# Patient Record
Sex: MALE | Race: WHITE | NOT HISPANIC OR LATINO | Employment: FULL TIME | URBAN - METROPOLITAN AREA
[De-identification: names, ages, dates, MRNs, and addresses within clinical notes are randomized per-mention and may not be internally consistent; named-entity substitution may affect disease eponyms.]

---

## 2017-09-07 ENCOUNTER — HOSPITAL ENCOUNTER (EMERGENCY)
Facility: HOSPITAL | Age: 32
Discharge: HOME | End: 2017-09-07
Attending: EMERGENCY MEDICINE
Payer: COMMERCIAL

## 2017-09-07 VITALS
RESPIRATION RATE: 18 BRPM | HEART RATE: 93 BPM | TEMPERATURE: 98.1 F | OXYGEN SATURATION: 99 % | SYSTOLIC BLOOD PRESSURE: 134 MMHG | WEIGHT: 145 LBS | DIASTOLIC BLOOD PRESSURE: 81 MMHG

## 2017-09-07 DIAGNOSIS — R36.9 ABNORMAL PENILE DISCHARGE, WITH BLOOD: Primary | ICD-10-CM

## 2017-09-07 LAB
ALBUMIN SERPL BCP-MCNC: 4.4 G/DL (ref 3.5–5)
ALP SERPL-CCNC: 63 U/L (ref 46–116)
ALT SERPL W P-5'-P-CCNC: 16 U/L (ref 12–78)
AMORPH PHOS CRY URNS QL MICRO: ABNORMAL /HPF
ANION GAP SERPL CALCULATED.3IONS-SCNC: 9 MMOL/L (ref 4–13)
AST SERPL W P-5'-P-CCNC: 9 U/L (ref 5–45)
BACTERIA UR QL AUTO: ABNORMAL /HPF
BASOPHILS # BLD AUTO: 0.05 THOUSANDS/ΜL (ref 0–0.1)
BASOPHILS NFR BLD AUTO: 1 % (ref 0–1)
BILIRUB SERPL-MCNC: 2.1 MG/DL (ref 0.2–1)
BILIRUB UR QL STRIP: ABNORMAL
BUN SERPL-MCNC: 8 MG/DL (ref 5–25)
CALCIUM SERPL-MCNC: 9.4 MG/DL (ref 8.3–10.1)
CHLORIDE SERPL-SCNC: 101 MMOL/L (ref 100–108)
CLARITY UR: ABNORMAL
CO2 SERPL-SCNC: 29 MMOL/L (ref 21–32)
COLOR UR: YELLOW
CREAT SERPL-MCNC: 0.94 MG/DL (ref 0.6–1.3)
EOSINOPHIL # BLD AUTO: 0.07 THOUSAND/ΜL (ref 0–0.61)
EOSINOPHIL NFR BLD AUTO: 1 % (ref 0–6)
ERYTHROCYTE [DISTWIDTH] IN BLOOD BY AUTOMATED COUNT: 11.9 % (ref 11.6–15.1)
GFR SERPL CREATININE-BSD FRML MDRD: 109 ML/MIN/1.73SQ M
GLUCOSE SERPL-MCNC: 112 MG/DL (ref 65–140)
GLUCOSE UR STRIP-MCNC: NEGATIVE MG/DL
HCT VFR BLD AUTO: 42.8 % (ref 36.5–49.3)
HGB BLD-MCNC: 14.9 G/DL (ref 12–17)
HGB UR QL STRIP.AUTO: ABNORMAL
KETONES UR STRIP-MCNC: NEGATIVE MG/DL
LEUKOCYTE ESTERASE UR QL STRIP: ABNORMAL
LYMPHOCYTES # BLD AUTO: 1.61 THOUSANDS/ΜL (ref 0.6–4.47)
LYMPHOCYTES NFR BLD AUTO: 24 % (ref 14–44)
MCH RBC QN AUTO: 31.4 PG (ref 26.8–34.3)
MCHC RBC AUTO-ENTMCNC: 34.8 G/DL (ref 31.4–37.4)
MCV RBC AUTO: 90 FL (ref 82–98)
MONOCYTES # BLD AUTO: 0.8 THOUSAND/ΜL (ref 0.17–1.22)
MONOCYTES NFR BLD AUTO: 12 % (ref 4–12)
NEUTROPHILS # BLD AUTO: 4.28 THOUSANDS/ΜL (ref 1.85–7.62)
NEUTS SEG NFR BLD AUTO: 63 % (ref 43–75)
NITRITE UR QL STRIP: POSITIVE
NON-SQ EPI CELLS URNS QL MICRO: ABNORMAL /HPF
NRBC BLD AUTO-RTO: 0 /100 WBCS
PH UR STRIP.AUTO: 7.5 [PH] (ref 4.5–8)
PLATELET # BLD AUTO: 267 THOUSANDS/UL (ref 149–390)
PMV BLD AUTO: 10 FL (ref 8.9–12.7)
POTASSIUM SERPL-SCNC: 3.8 MMOL/L (ref 3.5–5.3)
PROT SERPL-MCNC: 8.4 G/DL (ref 6.4–8.2)
PROT UR STRIP-MCNC: ABNORMAL MG/DL
RBC # BLD AUTO: 4.75 MILLION/UL (ref 3.88–5.62)
RBC #/AREA URNS AUTO: ABNORMAL /HPF
SODIUM SERPL-SCNC: 139 MMOL/L (ref 136–145)
SP GR UR STRIP.AUTO: 1.01 (ref 1–1.03)
UROBILINOGEN UR QL STRIP.AUTO: 4 E.U./DL
WBC # BLD AUTO: 6.83 THOUSAND/UL (ref 4.31–10.16)
WBC #/AREA URNS AUTO: ABNORMAL /HPF

## 2017-09-07 PROCEDURE — 85025 COMPLETE CBC W/AUTO DIFF WBC: CPT | Performed by: EMERGENCY MEDICINE

## 2017-09-07 PROCEDURE — 80053 COMPREHEN METABOLIC PANEL: CPT | Performed by: EMERGENCY MEDICINE

## 2017-09-07 PROCEDURE — 87086 URINE CULTURE/COLONY COUNT: CPT | Performed by: EMERGENCY MEDICINE

## 2017-09-07 PROCEDURE — 87186 SC STD MICRODIL/AGAR DIL: CPT | Performed by: EMERGENCY MEDICINE

## 2017-09-07 PROCEDURE — 96372 THER/PROPH/DIAG INJ SC/IM: CPT

## 2017-09-07 PROCEDURE — 87491 CHLMYD TRACH DNA AMP PROBE: CPT | Performed by: EMERGENCY MEDICINE

## 2017-09-07 PROCEDURE — 87077 CULTURE AEROBIC IDENTIFY: CPT | Performed by: EMERGENCY MEDICINE

## 2017-09-07 PROCEDURE — 87591 N.GONORRHOEAE DNA AMP PROB: CPT | Performed by: EMERGENCY MEDICINE

## 2017-09-07 PROCEDURE — 36415 COLL VENOUS BLD VENIPUNCTURE: CPT | Performed by: EMERGENCY MEDICINE

## 2017-09-07 PROCEDURE — 99283 EMERGENCY DEPT VISIT LOW MDM: CPT

## 2017-09-07 PROCEDURE — 81001 URINALYSIS AUTO W/SCOPE: CPT | Performed by: EMERGENCY MEDICINE

## 2017-09-07 RX ORDER — AZITHROMYCIN 250 MG/1
1000 TABLET, FILM COATED ORAL ONCE
Status: COMPLETED | OUTPATIENT
Start: 2017-09-07 | End: 2017-09-07

## 2017-09-07 RX ORDER — SULFAMETHOXAZOLE AND TRIMETHOPRIM 800; 160 MG/1; MG/1
1 TABLET ORAL 2 TIMES DAILY
Qty: 28 TABLET | Refills: 0 | Status: SHIPPED | OUTPATIENT
Start: 2017-09-07 | End: 2017-09-21

## 2017-09-07 RX ORDER — METRONIDAZOLE 500 MG/1
2000 TABLET ORAL ONCE
Status: COMPLETED | OUTPATIENT
Start: 2017-09-07 | End: 2017-09-07

## 2017-09-07 RX ORDER — SULFAMETHOXAZOLE AND TRIMETHOPRIM 800; 160 MG/1; MG/1
1 TABLET ORAL ONCE
Status: COMPLETED | OUTPATIENT
Start: 2017-09-07 | End: 2017-09-07

## 2017-09-07 RX ADMIN — METRONIDAZOLE 2000 MG: 500 TABLET ORAL at 02:20

## 2017-09-07 RX ADMIN — SULFAMETHOXAZOLE AND TRIMETHOPRIM 1 TABLET: 800; 160 TABLET ORAL at 03:26

## 2017-09-07 RX ADMIN — CEFTRIAXONE SODIUM 250 MG: 250 INJECTION, POWDER, FOR SOLUTION INTRAMUSCULAR; INTRAVENOUS at 02:20

## 2017-09-07 RX ADMIN — AZITHROMYCIN 1000 MG: 250 TABLET, FILM COATED ORAL at 02:20

## 2017-09-09 LAB — BACTERIA UR CULT: NORMAL

## 2017-09-11 LAB
CHLAMYDIA DNA CVX QL NAA+PROBE: NORMAL
N GONORRHOEA DNA GENITAL QL NAA+PROBE: NORMAL

## 2018-05-25 ENCOUNTER — OFFICE VISIT (OUTPATIENT)
Dept: CARDIOLOGY CLINIC | Facility: CLINIC | Age: 33
End: 2018-05-25
Payer: COMMERCIAL

## 2018-05-25 VITALS
OXYGEN SATURATION: 98 % | SYSTOLIC BLOOD PRESSURE: 132 MMHG | WEIGHT: 138.7 LBS | HEART RATE: 64 BPM | DIASTOLIC BLOOD PRESSURE: 76 MMHG

## 2018-05-25 DIAGNOSIS — R00.2 PALPITATIONS: ICD-10-CM

## 2018-05-25 DIAGNOSIS — R07.9 CHEST PAIN, UNSPECIFIED TYPE: Primary | ICD-10-CM

## 2018-05-25 PROCEDURE — 93000 ELECTROCARDIOGRAM COMPLETE: CPT | Performed by: INTERNAL MEDICINE

## 2018-05-25 PROCEDURE — 99204 OFFICE O/P NEW MOD 45 MIN: CPT | Performed by: INTERNAL MEDICINE

## 2018-05-25 RX ORDER — ASPIRIN 81 MG/1
81 TABLET ORAL DAILY
COMMUNITY
End: 2020-06-30 | Stop reason: ALTCHOICE

## 2018-05-25 RX ORDER — DIPHENOXYLATE HYDROCHLORIDE AND ATROPINE SULFATE 2.5; .025 MG/1; MG/1
1 TABLET ORAL DAILY
COMMUNITY

## 2018-05-25 NOTE — PROGRESS NOTES
Cardiology Consultation     Tran Lema  [phone]  1985  [facility] [place]  [place] [id number]      1  Chest pain, unspecified type  POCT ECG    Echo complete with contrast if indicated    Holter monitor - 48 hour    Stress test only, exercise    Lipid Panel with Direct LDL reflex   2  Palpitations  Echo complete with contrast if indicated    Holter monitor - 48 hour    Stress test only, exercise    Comprehensive metabolic panel    TSH, 3rd generation with T4 reflex       Discussion/Summary:    Angel Luis 29year old man with symptoms of chest pain  He had recent symptoms of a "panic attack" which I am suspicious may be related to arrhythmia  He has occasional palpitations as well, and some extrasystoles heard on exam     Discussed evaluation to exclude structural heart disease with echocardiogram   Exercise treadmill test given chest pain with a normal EKG at baseline  Holter monitor to exclude any arrhythmia  Check CMP and thyroid function  Lipid panel for further risk stratification  Discussed lifestyle modification of decreasing the caffeine intake and also tobacco and alcohol  If necessary, we can perform monitoring for a longer period of time in the future based on recurrence of his symptoms  If testing above shows no significant abnormalities, can follow with primary care physician for further evaluation of symptoms  Additional evaluation based on the above  History of Present Illness:    Angel Luis 79-year-old gentleman comes to the office today for an initial visit for symptoms of chest pain  He has been experiencing these past 2 weeks  He has cracked uses sharp and very short in duration  They are located on both sides of the chest read under his breast   Also 1 in the left upper quadrant of his abdomen    He was concerned this is related to his heart, so scheduled visit  Patient has no history of any cardiac problems  There is some family history of a heart condition in his father, but unclear details  Denies any cardiac testing before  About 2 weeks ago, patient had a panic attack  He was in the mall had a store and felt the sudden onset of symptoms of racing heart flushing in the face and feeling as though he was going to lose consciousness  He said this lasted for a pretty long period of time  He needed to sit down  Ultimately found his wife  Symptoms resolved on their own  He was evaluated at an urgent care, but symptoms had improved by that point  He was told vitals were normal     He has seen a family practitioner once  He is originally from [place], and moved to the area  He works at [employer]  He does lot of walking throughout the day for his job  Lifts heavy boxes  Exertion activity do not necessarily bring on the symptoms  He will also feel the symptoms when he is at rest in bed  Given his work schedule lifestyle, he drinks a lot of caffeine  He has a lot of energy drinks  He is using and other over-the-counter supplement for energy which is typically used for body building  He does not drink a lot of additional coffee or sodas  Tobacco in the form of occasional smoking and dip  Alcohol but not to excess typically  Patient Active Problem List   Diagnosis    Chest pain    Palpitations     Past Medical History:   Diagnosis Date    Herpes      Social History     Social History    Marital status: Single     Spouse name: N/A    Number of children: N/A    Years of education: N/A     Occupational History    Not on file       Social History Main Topics    Smoking status: Light Tobacco Smoker    Smokeless tobacco: Current User     Types: Chew    Alcohol use Yes      Comment: social     Drug use: No    Sexual activity: Not on file     Other Topics Concern    Not on file     Social History Narrative    No narrative on file Family History   Problem Relation Age of Onset    Clotting disorder Mother     Fibromyalgia Mother     Hyperlipidemia Mother     Clotting disorder Father     Hypertension Father     Hyperlipidemia Father     Stroke Paternal Grandmother     Heart attack Paternal Grandfather     Aneurysm Neg Hx     Arrhythmia Neg Hx      History reviewed  No pertinent surgical history  Current Outpatient Prescriptions:     aspirin (ECOTRIN LOW STRENGTH) 81 mg EC tablet, Take 81 mg by mouth daily, Disp: , Rfl:     multivitamin (THERAGRAN) TABS, Take 1 tablet by mouth daily, Disp: , Rfl:   No Known Allergies    Vitals:    05/25/18 1001   BP: 132/76   BP Location: Left arm   Patient Position: Sitting   Cuff Size: Adult   Pulse: 64   SpO2: 98%   Weight: 62 9 kg (138 lb 11 2 oz)     Vitals:    05/25/18 1001   Weight: 62 9 kg (138 lb 11 2 oz)          Physical Exam:  GENERAL: Alert, well appearing, and in no distress  HEENT:  PERRL, EOMI, no scleral icterus, no conjunctival pallor  NECK:  Supple, No elevated JVP, no thyromegaly, no carotid bruits  HEART:  Regular rate and rhythm, normal S1/S2, no S3/S4, no murmur or rub  Occasional extrasystoles heard  LUNGS:  Clear to auscultation bilaterally  ABDOMEN:  Soft, non-tender, positive bowel sounds, no rebound or guarding  EXTREMITIES:  No edema  VASCULAR:  Normal pedal pulses   NEURO: No focal deficits,  SKIN: Normal without suspicious lesions on exposed skin    ROS:  Except as noted in HPI, is otherwise reviewed in detail and a 12 point review of systems is negative      Labs:  Lab Results   Component Value Date     09/07/2017    K 3 8 09/07/2017     09/07/2017    CREATININE 0 94 09/07/2017    BUN 8 09/07/2017    CO2 29 09/07/2017    ALT 16 09/07/2017    AST 9 09/07/2017    WBC 6 83 09/07/2017    HGB 14 9 09/07/2017    HCT 42 8 09/07/2017     09/07/2017       No results found for: CHOL  No results found for: HDL  No results found for: LDLCALC  No results found for: TRIG    EKG:  Sinus arrhythmia, incomplete right bundle branch block  Normal for age

## 2018-06-09 ENCOUNTER — APPOINTMENT (EMERGENCY)
Dept: RADIOLOGY | Facility: HOSPITAL | Age: 33
End: 2018-06-09
Payer: COMMERCIAL

## 2018-06-09 ENCOUNTER — HOSPITAL ENCOUNTER (EMERGENCY)
Facility: HOSPITAL | Age: 33
Discharge: HOME | End: 2018-06-09
Attending: EMERGENCY MEDICINE | Admitting: EMERGENCY MEDICINE
Payer: COMMERCIAL

## 2018-06-09 VITALS
HEIGHT: 71 IN | RESPIRATION RATE: 16 BRPM | HEART RATE: 93 BPM | BODY MASS INDEX: 18.76 KG/M2 | SYSTOLIC BLOOD PRESSURE: 133 MMHG | TEMPERATURE: 97.9 F | OXYGEN SATURATION: 100 % | DIASTOLIC BLOOD PRESSURE: 77 MMHG | WEIGHT: 134 LBS

## 2018-06-09 DIAGNOSIS — F41.9 ANXIETY: ICD-10-CM

## 2018-06-09 DIAGNOSIS — R06.02 SHORTNESS OF BREATH: ICD-10-CM

## 2018-06-09 DIAGNOSIS — R00.2 PALPITATIONS: Primary | ICD-10-CM

## 2018-06-09 LAB
ANION GAP SERPL CALCULATED.3IONS-SCNC: 8 MMOL/L (ref 4–13)
BASOPHILS # BLD AUTO: 0.06 THOUSANDS/ΜL (ref 0–0.1)
BASOPHILS NFR BLD AUTO: 1 % (ref 0–1)
BUN SERPL-MCNC: 13 MG/DL (ref 5–25)
CALCIUM SERPL-MCNC: 9 MG/DL (ref 8.3–10.1)
CHLORIDE SERPL-SCNC: 100 MMOL/L (ref 100–108)
CO2 SERPL-SCNC: 27 MMOL/L (ref 21–32)
CREAT SERPL-MCNC: 0.87 MG/DL (ref 0.6–1.3)
EOSINOPHIL # BLD AUTO: 0.03 THOUSAND/ΜL (ref 0–0.61)
EOSINOPHIL NFR BLD AUTO: 0 % (ref 0–6)
ERYTHROCYTE [DISTWIDTH] IN BLOOD BY AUTOMATED COUNT: 11.9 % (ref 11.6–15.1)
GFR SERPL CREATININE-BSD FRML MDRD: 116 ML/MIN/1.73SQ M
GLUCOSE SERPL-MCNC: 99 MG/DL (ref 65–140)
HCT VFR BLD AUTO: 43 % (ref 36.5–49.3)
HGB BLD-MCNC: 14.9 G/DL (ref 12–17)
IMM GRANULOCYTES # BLD AUTO: 0.05 THOUSAND/UL (ref 0–0.2)
IMM GRANULOCYTES NFR BLD AUTO: 0 % (ref 0–2)
LYMPHOCYTES # BLD AUTO: 1.3 THOUSANDS/ΜL (ref 0.6–4.47)
LYMPHOCYTES NFR BLD AUTO: 11 % (ref 14–44)
MAGNESIUM SERPL-MCNC: 1.8 MG/DL (ref 1.6–2.6)
MCH RBC QN AUTO: 31.4 PG (ref 26.8–34.3)
MCHC RBC AUTO-ENTMCNC: 34.7 G/DL (ref 31.4–37.4)
MCV RBC AUTO: 91 FL (ref 82–98)
MONOCYTES # BLD AUTO: 0.58 THOUSAND/ΜL (ref 0.17–1.22)
MONOCYTES NFR BLD AUTO: 5 % (ref 4–12)
NEUTROPHILS # BLD AUTO: 10.22 THOUSANDS/ΜL (ref 1.85–7.62)
NEUTS SEG NFR BLD AUTO: 83 % (ref 43–75)
NRBC BLD AUTO-RTO: 0 /100 WBCS
PLATELET # BLD AUTO: 207 THOUSANDS/UL (ref 149–390)
PMV BLD AUTO: 10.2 FL (ref 8.9–12.7)
POTASSIUM SERPL-SCNC: 3.6 MMOL/L (ref 3.5–5.3)
RBC # BLD AUTO: 4.74 MILLION/UL (ref 3.88–5.62)
SODIUM SERPL-SCNC: 135 MMOL/L (ref 136–145)
TSH SERPL DL<=0.05 MIU/L-ACNC: 0.51 UIU/ML (ref 0.36–3.74)
WBC # BLD AUTO: 12.24 THOUSAND/UL (ref 4.31–10.16)

## 2018-06-09 PROCEDURE — 84443 ASSAY THYROID STIM HORMONE: CPT | Performed by: EMERGENCY MEDICINE

## 2018-06-09 PROCEDURE — 80048 BASIC METABOLIC PNL TOTAL CA: CPT | Performed by: EMERGENCY MEDICINE

## 2018-06-09 PROCEDURE — 85025 COMPLETE CBC W/AUTO DIFF WBC: CPT | Performed by: EMERGENCY MEDICINE

## 2018-06-09 PROCEDURE — 36415 COLL VENOUS BLD VENIPUNCTURE: CPT | Performed by: EMERGENCY MEDICINE

## 2018-06-09 PROCEDURE — 99284 EMERGENCY DEPT VISIT MOD MDM: CPT

## 2018-06-09 PROCEDURE — 93005 ELECTROCARDIOGRAM TRACING: CPT

## 2018-06-09 PROCEDURE — 71046 X-RAY EXAM CHEST 2 VIEWS: CPT

## 2018-06-09 PROCEDURE — 83735 ASSAY OF MAGNESIUM: CPT | Performed by: EMERGENCY MEDICINE

## 2018-06-09 RX ORDER — PAROXETINE 10 MG/1
10 TABLET, FILM COATED ORAL DAILY
COMMUNITY

## 2018-06-09 NOTE — ED NOTES
Pt with very flat affect, very hypoactive        Mars Soria RN  06/09/18 1954      Pt also stating "I just can't breathe " Spo2 100% on RA     Mars Soria RN  06/09/18 1955

## 2018-06-10 LAB
ATRIAL RATE: 77 BPM
P AXIS: 63 DEGREES
PR INTERVAL: 124 MS
QRS AXIS: 42 DEGREES
QRSD INTERVAL: 112 MS
QT INTERVAL: 418 MS
QTC INTERVAL: 473 MS
T WAVE AXIS: 61 DEGREES
VENTRICULAR RATE: 77 BPM

## 2018-06-10 PROCEDURE — 93010 ELECTROCARDIOGRAM REPORT: CPT | Performed by: INTERNAL MEDICINE

## 2018-06-10 NOTE — ED PROVIDER NOTES
History  Chief Complaint   Patient presents with    Panic Attack     Pt states he is having a panic attack, last happened 3-4 weeks ago, states feel heaviness in chest  Takes Paroxetine, took earlier today  HPI    Prior to Admission Medications   Prescriptions Last Dose Informant Patient Reported? Taking? PARoxetine (PAXIL) 10 mg tablet 6/9/2018 at Unknown time  Yes Yes   Sig: Take 10 mg by mouth daily   aspirin (ECOTRIN LOW STRENGTH) 81 mg EC tablet  Self Yes No   Sig: Take 81 mg by mouth daily   multivitamin (THERAGRAN) TABS  Self Yes No   Sig: Take 1 tablet by mouth daily      Facility-Administered Medications: None       Past Medical History:   Diagnosis Date    Anxiety     Herpes        History reviewed  No pertinent surgical history  Family History   Problem Relation Age of Onset    Clotting disorder Mother     Fibromyalgia Mother     Hyperlipidemia Mother     Clotting disorder Father     Hypertension Father     Hyperlipidemia Father     Stroke Paternal Grandmother     Heart attack Paternal Grandfather     Aneurysm Neg Hx     Arrhythmia Neg Hx      I have reviewed and agree with the history as documented  Social History   Substance Use Topics    Smoking status: Light Tobacco Smoker    Smokeless tobacco: Current User     Types: Chew    Alcohol use Yes      Comment: social         Review of Systems    Physical Exam  Physical Exam   Constitutional: He is oriented to person, place, and time  He appears well-developed and well-nourished  No distress  HENT:   Head: Normocephalic and atraumatic  Mouth/Throat: Oropharynx is clear and moist    Eyes: Conjunctivae are normal  Pupils are equal, round, and reactive to light  Neck: Normal range of motion  No tracheal deviation present  Cardiovascular: Normal rate, regular rhythm, normal heart sounds and intact distal pulses  Pulmonary/Chest: Effort normal and breath sounds normal  No respiratory distress  He exhibits no tenderness  Abdominal: Soft  He exhibits no distension  There is no tenderness  Musculoskeletal: He exhibits no edema  Neurological: He is alert and oriented to person, place, and time  GCS eye subscore is 4  GCS verbal subscore is 5  GCS motor subscore is 6  Skin: Skin is warm and dry  Psychiatric: His behavior is normal  His affect is blunt  Flat affect   Nursing note and vitals reviewed  Vital Signs  ED Triage Vitals   Temperature Pulse Respirations Blood Pressure SpO2   06/09/18 1933 06/09/18 1933 06/09/18 1933 06/09/18 1933 06/09/18 1954   97 9 °F (36 6 °C) (!) 119 20 135/74 100 %      Temp Source Heart Rate Source Patient Position - Orthostatic VS BP Location FiO2 (%)   06/09/18 1933 06/09/18 1933 06/09/18 1954 06/09/18 1954 --   Oral Monitor Sitting Right arm       Pain Score       06/09/18 1933       No Pain           Vitals:    06/09/18 1933 06/09/18 1954   BP: 135/74 133/77   Pulse: (!) 119 93   Patient Position - Orthostatic VS:  Sitting       Visual Acuity      ED Medications  Medications - No data to display    Diagnostic Studies  Results Reviewed     Procedure Component Value Units Date/Time    Basic metabolic panel [[id number]]  (Abnormal) Collected:  06/09/18 2108    Lab Status:  Final result Specimen:  Blood from Arm, Right Updated:  06/09/18 2147     Sodium 135 (L) mmol/L      Potassium 3 6 mmol/L      Chloride 100 mmol/L      CO2 27 mmol/L      Anion Gap 8 mmol/L      BUN 13 mg/dL      Creatinine 0 87 mg/dL      Glucose 99 mg/dL      Calcium 9 0 mg/dL      eGFR 116 ml/min/1 73sq m     Narrative:         National Kidney Disease Education Program recommendations are as follows:  GFR calculation is accurate only with a steady state creatinine  Chronic Kidney disease less than 60 ml/min/1 73 sq  meters  Kidney failure less than 15 ml/min/1 73 sq  meters      TSH [[id number]]  (Normal) Collected:  06/09/18 2108    Lab Status:  Final result Specimen:  Blood from Arm, Right Updated:  06/09/18 2147 TSH 3RD GENERATON 0 511 uIU/mL     Narrative:         Patients undergoing fluorescein dye angiography may retain small amounts of fluorescein in the body for 48-72 hours post procedure  Samples containing fluorescein can produce falsely depressed TSH values  If the patient had this procedure,a specimen should be resubmitted post fluorescein clearance      Magnesium [[id number]]  (Normal) Collected:  06/09/18 2108    Lab Status:  Final result Specimen:  Blood from Arm, Right Updated:  06/09/18 2147     Magnesium 1 8 mg/dL     CBC and differential [[id number]]  (Abnormal) Collected:  06/09/18 2108    Lab Status:  Final result Specimen:  Blood from Arm, Right Updated:  06/09/18 2121     WBC 12 24 (H) Thousand/uL      RBC 4 74 Million/uL      Hemoglobin 14 9 g/dL      Hematocrit 43 0 %      MCV 91 fL      MCH 31 4 pg      MCHC 34 7 g/dL      RDW 11 9 %      MPV 10 2 fL      Platelets 581 Thousands/uL      nRBC 0 /100 WBCs      Neutrophils Relative 83 (H) %      Immat GRANS % 0 %      Lymphocytes Relative 11 (L) %      Monocytes Relative 5 %      Eosinophils Relative 0 %      Basophils Relative 1 %      Neutrophils Absolute 10 22 (H) Thousands/µL      Immature Grans Absolute 0 05 Thousand/uL      Lymphocytes Absolute 1 30 Thousands/µL      Monocytes Absolute 0 58 Thousand/µL      Eosinophils Absolute 0 03 Thousand/µL      Basophils Absolute 0 06 Thousands/µL     Rapid drug screen, urine [[id number]]     Lab Status:  No result Specimen:  Urine                  XR chest 2 views    (Results Pending)              Procedures  ECG 12 Lead Documentation  Date/Time: 6/9/2018 9:06 PM  Performed by: Kari Mayes  Authorized by: Kari ARMENDARIZ     Indications / Diagnosis:  Palpitations, panic attack  ECG reviewed by me, the ED Provider: yes    Patient location:  ED  Previous ECG:     Previous ECG:  Unavailable  Interpretation:     Interpretation: non-specific    Rate:     ECG rate:  77    ECG rate assessment: normal    Rhythm:     Rhythm: sinus rhythm    Ectopy:     Ectopy: none    QRS:     QRS axis:  Normal    QRS intervals:  Normal  Conduction:     Conduction: abnormal      Abnormal conduction: incomplete RBBB    ST segments:     ST segments:  Normal  T waves:     T waves: normal    Comments:      Per the cardiologist read of the EKG obtained at his appointment on 5/25/18, he had an incomplete right bundle branch block at that point in time, though I am unable to visualize this EKG  Phone Contacts  ED Phone Contact    ED Course                               MDM  Number of Diagnoses or Management Options  Anxiety: new and requires workup  Palpitations: new and requires workup  Shortness of breath: new and requires workup  Diagnosis management comments: This is a 27-year-old male who presents here today with a "panic attack "  He states about half an hour prior to arrival he was having mild trouble breathing which seemed to gradually get worse  He felt like both of his arms were going numb, followed by his legs  He was sitting in the car as a passenger when this occurred  He focus on his breathing and his symptoms gradually improved  He still feels like he is somewhat short of breath at this time  He endorses a chest "heaviness" that was worst during the event but is gradually improving  He feels like his heart was racing, but is unable to state whether it was racing prior to symptom onset were started after symptom onset  He states he has had similar symptoms for several months, initially just with chest pain and trouble breathing, then over time developed the anxiety component, with occasional feelings that arms or legs were going numb  Was started on paroxetine by his primary care doctor a couple of weeks ago but does not feel like this is making any change in his symptoms   He was seen by a cardiologist on 5/25, and at that time was endorsing prolonged episodes of symptoms, that was more concerning for possible underlying dysrhythmia  He was scheduled for outpatient stress test and Holter monitor, which she states he thinks is scheduled for early July  He denies prior problems with anxiety, depression, panic attacks prior to the past several months  He denies any other changes in medications  He denies any alcohol or drug use  He denies any rapid weight gain or weight loss  He denies any known personal or family history of thyroid problems or dysrhythmias  He denies any recent travel or lower extremity edema  He denies any recent infectious symptoms or over-the-counter medications  ROS: Otherwise negative, unless stated as above  He is well-appearing, in no acute distress  He does have a very flat affect  His exam is unremarkable  His heart rate documented as being 119 in triage, but this improved to the 90s by the time he came back to the room, and has been persistent in the 80-90 range  An EKG was not obtained while he was tachycardic  This could be due to anxiety, or underlying dysrhythmia  Given his tachycardia, we will check lab work to evaluate for electrolyte abnormalities, thyroid abnormalities contributing to this, as well as to his symptoms  Chest x-ray was reviewed by myself, and shows no acute abnormalities  His lab work is unremarkable  He is unable to urinate, and does not want to wait any longer to collect this  He has had no dysrhythmias or recurrent tachycardia while on the monitor here  I discussed with him treatment at home, follow-up with his primary care doctor and his cardiologist, and indications for return, and he expresses understanding with this plan           Amount and/or Complexity of Data Reviewed  Clinical lab tests: reviewed and ordered  Tests in the radiology section of CPT®: reviewed and ordered  Decide to obtain previous medical records or to obtain history from someone other than the patient: yes  Review and summarize past medical records: yes  Independent visualization of images, tracings, or specimens: yes      CritCare Time    Disposition  Final diagnoses:   Palpitations   Anxiety   Shortness of breath     Time reflects when diagnosis was documented in both MDM as applicable and the Disposition within this note     Time User Action Codes Description Comment    6/9/2018 11:32 PM Sanford Joshua Cost Add [R00 2] Palpitations     6/9/2018 11:32 PM Sanford Joshua Add [F41 9] Anxiety     6/9/2018 11:32 PM Sanford Joshua Add [R06 02] Shortness of breath       ED Disposition     ED Disposition Condition Comment    Discharge  Asha Bykira discharge to home/self care  Condition at discharge: Good        Follow-up Information     Follow up With Specialties Details Why Jairo Hugo MD Cardiology, Cardiology Imaging, Multidisciplinary Call to see if you can move up your testing [address] [place] [address]      Italia Crew, DO Family Medicine Schedule an appointment as soon as possible for a visit for further evaluation of your symptoms [address] [place] [address]  [phone]            Patient's Medications   Discharge Prescriptions    No medications on file     No discharge procedures on file      ED Provider  Electronically Signed by           Elba Toledo MD  06/09/18 8217

## 2018-06-10 NOTE — ED NOTES
Provider at bedside     Eulalio Wade, [facility]  06/09/18 2042

## 2018-06-10 NOTE — DISCHARGE INSTRUCTIONS
Continue to take your medications as prescribed  Follow up with your cardiologist see if you can move up your testing  Follow up with your primary care doctor for further evaluation of your symptoms  Return for worsening or changing symptoms, or for any other concerns  Heart Palpitations   WHAT YOU NEED TO KNOW:   Heart palpitations are feelings that your heart races, jumps, throbs, or flutters  You may feel extra beats, no beats for a short time, or skipped beats  You may have these feelings in your chest, throat, or neck  They may happen when you are sitting, standing, or lying  Heart palpitations may be frightening, but are usually not caused by a serious problem  DISCHARGE INSTRUCTIONS:   Call 911 or have someone else call for any of the following:   · You have any of the following signs of a heart attack:      ¨ Squeezing, pressure, or pain in your chest that lasts longer than 5 minutes or returns    ¨ Discomfort or pain in your back, neck, jaw, stomach, or arm     ¨ Trouble breathing    ¨ Nausea or vomiting    ¨ Lightheadedness or a sudden cold sweat, especially with chest pain or trouble breathing    · You have any of the following signs of a stroke:      ¨ Numbness or drooping on one side of your face     ¨ Weakness in an arm or leg    ¨ Confusion or difficulty speaking    ¨ Dizziness, a severe headache, or vision loss    · You faint or lose consciousness  Return to the emergency department if:   · Your palpitations happen more often or get more intense  Contact your healthcare provider if:   · You have new or worsening swelling in your feet or ankles  · You have questions or concerns about your condition or care  Follow up with your healthcare provider as directed: You may need to follow up with a cardiologist  Philippe Lozano may need tests to check for heart problems that cause palpitations  Write down your questions so you remember to ask them during your visits     Keep a record:  Write down when your palpitations start and stop, what you were doing when they started, and your symptoms  Keep track of what you ate or drank within a few hours of your palpitations  Include anything that seemed to help your symptoms, such as lying down or holding your breath  This record will help you and your healthcare provider learn what triggers your palpitations  Bring this record with you to your follow up visits  Help prevent heart palpitations:   · Manage stress and anxiety  Find ways to relax such as listening to music, meditating, or doing yoga  Exercise can also help decrease stress and anxiety  Talk to someone you trust about your stress or anxiety  You can also talk to a therapist      · Get plenty of sleep every night  Ask your healthcare provider how much sleep you need each night  · Do not drink caffeine or alcohol  Caffeine and alcohol can make your palpitations worse  Caffeine is found in soda, coffee, tea, chocolate, and drinks that increase your energy  · Do not smoke  Nicotine and other chemicals in cigarettes and cigars may damage your heart and blood vessels  Ask your healthcare provider for information if you currently smoke and need help to quit  E-cigarettes or smokeless tobacco still contain nicotine  Talk to your healthcare provider before you use these products  · Do not use illegal drugs  Talk to your healthcare provider if you use illegal drugs and want help to quit  © 2017 [employer]  Information is for End User's use only and may not be sold, redistributed or otherwise used for commercial purposes  All illustrations and images included in CareNotes® are the copyrighted property of A D A M , Inc  or Carlos A Woodward  The above information is an  only  It is not intended as medical advice for individual conditions or treatments   Talk to your doctor, nurse or pharmacist before following any medical regimen to see if it is safe and effective for you  Anxiety   WHAT YOU NEED TO KNOW:   Anxiety is a condition that causes you to feel extremely worried or nervous  The feelings are so strong that they can cause problems with your daily activities or sleep  Anxiety may be triggered by something you fear, or it may happen without a cause  Family or work stress, smoking, caffeine, and alcohol can increase your risk for anxiety  Certain medicines or health conditions can also increase your risk  Anxiety can become a long-term condition if it is not managed or treated  DISCHARGE INSTRUCTIONS:   Call 911 if:   · You have chest pain, tightness, or heaviness that may spread to your shoulders, arms, jaw, neck, or back  · You feel like hurting yourself or someone else  Contact your healthcare provider if:   · Your symptoms get worse or do not get better with treatment  · Your anxiety keeps you from doing your regular daily activities  · You have new symptoms since your last visit  · You have questions or concerns about your condition or care  Medicines:   · Medicines  may be given to help you feel more calm and relaxed, and decrease your symptoms  · Take your medicine as directed  Contact your healthcare provider if you think your medicine is not helping or if you have side effects  Tell him of her if you are allergic to any medicine  Keep a list of the medicines, vitamins, and herbs you take  Include the amounts, and when and why you take them  Bring the list or the pill bottles to follow-up visits  Carry your medicine list with you in case of an emergency  Follow up with your healthcare provider within 2 weeks or as directed:  Write down your questions so you remember to ask them during your visits  Manage anxiety:   · Talk to someone about your anxiety  Your healthcare provider may suggest counseling  Cognitive behavioral therapy can help you understand and change how you react to events that trigger your symptoms   You might feel more comfortable talking with a friend or family member about your anxiety  Choose someone you know will be supportive and encouraging  · Find ways to relax  Activities such as exercise, meditation, or listening to music can help you relax  Spend time with friends, or do things you enjoy  · Practice deep breathing  Deep breathing can help you relax when you feel anxious  Focus on taking slow, deep breaths several times a day, or during an anxiety attack  Breathe in through your nose and out through your mouth  · Create a regular sleep routine  Regular sleep can help you feel calmer during the day  Go to sleep and wake up at the same times every day  Do not watch television or use the computer right before bed  Your room should be comfortable, dark, and quiet  · Eat a variety of healthy foods  Healthy foods include fruits, vegetables, low-fat dairy products, lean meats, fish, whole-grain breads, and cooked beans  Healthy foods can help you feel less anxious and have more energy  · Exercise regularly  Exercise can increase your energy level  Exercise may also lift your mood and help you sleep better  Your healthcare provider can help you create an exercise plan  · Do not smoke  Nicotine and other chemicals in cigarettes and cigars can increase anxiety  Ask your healthcare provider for information if you currently smoke and need help to quit  E-cigarettes or smokeless tobacco still contain nicotine  Talk to your healthcare provider before you use these products  · Do not have caffeine  Caffeine can make your symptoms worse  Do not have foods or drinks that are meant to increase your energy level  · Limit or do not drink alcohol  Ask your healthcare provider if alcohol is safe for you  You may not be able to drink alcohol if you take certain anxiety or depression medicines  Limit alcohol to 1 drink per day if you are a woman  Limit alcohol to 2 drinks per day if you are a man   A drink of alcohol is 12 ounces of beer, 5 ounces of wine, or 1½ ounces of liquor  · Do not use drugs  Drugs can make your anxiety worse  It can also make anxiety hard to manage  Talk to your healthcare provider if you use drugs and want help to quit  © 2017 [employer] Information is for End User's use only and may not be sold, redistributed or otherwise used for commercial purposes  All illustrations and images included in CareNotes® are the copyrighted property of A [employer] M [employer] Inc  or Carlos A Woodward  The above information is an  only  It is not intended as medical advice for individual conditions or treatments  Talk to your doctor, nurse or pharmacist before following any medical regimen to see if it is safe and effective for you

## 2018-06-10 NOTE — ED NOTES
Pt stated concern about having to wait for test results  Pt was informed the only result we are still waiting for is the urine sample which the pt was yet to provide  Pt asked if urine test could be "skipped"  Provider made aware         Donaldo Ryan RN  06/09/18 1959

## 2018-06-10 NOTE — ED NOTES
Pt refused PIV placement  D/w dr Jorge Luis Moreria  [id number] [address] for peripheral stick  Pt informed of need for urine sample  Pt will notify Rn when he needs to voids  specimen cup at bedside       Carlos A Pascal RN  06/09/18 5941

## 2018-07-09 ENCOUNTER — HOSPITAL ENCOUNTER (OUTPATIENT)
Dept: NON INVASIVE DIAGNOSTICS | Facility: CLINIC | Age: 33
Discharge: HOME | End: 2018-07-09
Payer: COMMERCIAL

## 2018-07-09 DIAGNOSIS — R00.2 PALPITATIONS: ICD-10-CM

## 2018-07-09 DIAGNOSIS — R07.9 CHEST PAIN, UNSPECIFIED TYPE: ICD-10-CM

## 2018-07-09 PROCEDURE — 93306 TTE W/DOPPLER COMPLETE: CPT | Performed by: INTERNAL MEDICINE

## 2018-07-09 PROCEDURE — 93306 TTE W/DOPPLER COMPLETE: CPT

## 2018-07-09 PROCEDURE — 93016 CV STRESS TEST SUPVJ ONLY: CPT | Performed by: INTERNAL MEDICINE

## 2018-07-09 PROCEDURE — 93017 CV STRESS TEST TRACING ONLY: CPT

## 2018-07-09 PROCEDURE — 93018 CV STRESS TEST I&R ONLY: CPT | Performed by: INTERNAL MEDICINE

## 2018-07-09 PROCEDURE — 93226 XTRNL ECG REC<48 HR SCAN A/R: CPT

## 2018-07-09 PROCEDURE — 93225 XTRNL ECG REC<48 HRS REC: CPT

## 2018-07-10 LAB
CHEST PAIN STATEMENT: NORMAL
MAX DIASTOLIC BP: 88 MMHG
MAX HEART RATE: 179 BPM
MAX PREDICTED HEART RATE: 190 BPM
MAX. SYSTOLIC BP: 180 MMHG
PROTOCOL NAME: NORMAL
REASON FOR TERMINATION: NORMAL
TARGET HR FORMULA: NORMAL
TIME IN EXERCISE PHASE: NORMAL

## 2018-07-17 ENCOUNTER — TELEPHONE (OUTPATIENT)
Dept: NON INVASIVE DIAGNOSTICS | Facility: HOSPITAL | Age: 33
End: 2018-07-17

## 2018-07-23 ENCOUNTER — TELEPHONE (OUTPATIENT)
Dept: CARDIOLOGY CLINIC | Facility: CLINIC | Age: 33
End: 2018-07-23

## 2018-07-23 PROCEDURE — 93227 XTRNL ECG REC<48 HR R&I: CPT | Performed by: INTERNAL MEDICINE

## 2019-06-13 ENCOUNTER — APPOINTMENT (EMERGENCY)
Dept: RADIOLOGY | Facility: HOSPITAL | Age: 34
End: 2019-06-13
Payer: COMMERCIAL

## 2019-06-13 ENCOUNTER — HOSPITAL ENCOUNTER (EMERGENCY)
Facility: HOSPITAL | Age: 34
Discharge: HOME | End: 2019-06-14
Attending: EMERGENCY MEDICINE | Admitting: EMERGENCY MEDICINE
Payer: COMMERCIAL

## 2019-06-13 DIAGNOSIS — R07.89 CHEST WALL PAIN: Primary | ICD-10-CM

## 2019-06-13 LAB
ALBUMIN SERPL BCP-MCNC: 4.7 G/DL (ref 3.5–5)
ALP SERPL-CCNC: 69 U/L (ref 46–116)
ALT SERPL W P-5'-P-CCNC: 20 U/L (ref 12–78)
ANION GAP SERPL CALCULATED.3IONS-SCNC: 6 MMOL/L (ref 4–13)
AST SERPL W P-5'-P-CCNC: 14 U/L (ref 5–45)
ATRIAL RATE: 72 BPM
BASOPHILS # BLD AUTO: 0.05 THOUSANDS/ΜL (ref 0–0.1)
BASOPHILS NFR BLD AUTO: 1 % (ref 0–1)
BILIRUB SERPL-MCNC: 1.7 MG/DL (ref 0.2–1)
BUN SERPL-MCNC: 11 MG/DL (ref 5–25)
CALCIUM SERPL-MCNC: 9.4 MG/DL (ref 8.3–10.1)
CHLORIDE SERPL-SCNC: 103 MMOL/L (ref 100–108)
CO2 SERPL-SCNC: 32 MMOL/L (ref 21–32)
CREAT SERPL-MCNC: 0.96 MG/DL (ref 0.6–1.3)
EOSINOPHIL # BLD AUTO: 0.09 THOUSAND/ΜL (ref 0–0.61)
EOSINOPHIL NFR BLD AUTO: 1 % (ref 0–6)
ERYTHROCYTE [DISTWIDTH] IN BLOOD BY AUTOMATED COUNT: 12.2 % (ref 11.6–15.1)
GFR SERPL CREATININE-BSD FRML MDRD: 105 ML/MIN/1.73SQ M
GLUCOSE SERPL-MCNC: 97 MG/DL (ref 65–140)
HCT VFR BLD AUTO: 43 % (ref 36.5–49.3)
HGB BLD-MCNC: 14.8 G/DL (ref 12–17)
IMM GRANULOCYTES # BLD AUTO: 0.02 THOUSAND/UL (ref 0–0.2)
IMM GRANULOCYTES NFR BLD AUTO: 0 % (ref 0–2)
LYMPHOCYTES # BLD AUTO: 1.72 THOUSANDS/ΜL (ref 0.6–4.47)
LYMPHOCYTES NFR BLD AUTO: 25 % (ref 14–44)
MCH RBC QN AUTO: 31.6 PG (ref 26.8–34.3)
MCHC RBC AUTO-ENTMCNC: 34.4 G/DL (ref 31.4–37.4)
MCV RBC AUTO: 92 FL (ref 82–98)
MONOCYTES # BLD AUTO: 0.5 THOUSAND/ΜL (ref 0.17–1.22)
MONOCYTES NFR BLD AUTO: 7 % (ref 4–12)
NEUTROPHILS # BLD AUTO: 4.62 THOUSANDS/ΜL (ref 1.85–7.62)
NEUTS SEG NFR BLD AUTO: 66 % (ref 43–75)
NRBC BLD AUTO-RTO: 0 /100 WBCS
P AXIS: 66 DEGREES
PLATELET # BLD AUTO: 222 THOUSANDS/UL (ref 149–390)
PMV BLD AUTO: 10.2 FL (ref 8.9–12.7)
POTASSIUM SERPL-SCNC: 3.5 MMOL/L (ref 3.5–5.3)
PR INTERVAL: 122 MS
PROT SERPL-MCNC: 7.9 G/DL (ref 6.4–8.2)
QRS AXIS: 69 DEGREES
QRSD INTERVAL: 112 MS
QT INTERVAL: 396 MS
QTC INTERVAL: 433 MS
RBC # BLD AUTO: 4.68 MILLION/UL (ref 3.88–5.62)
SODIUM SERPL-SCNC: 141 MMOL/L (ref 136–145)
T WAVE AXIS: 72 DEGREES
TROPONIN I SERPL-MCNC: <0.02 NG/ML
VENTRICULAR RATE: 72 BPM
WBC # BLD AUTO: 7 THOUSAND/UL (ref 4.31–10.16)

## 2019-06-13 PROCEDURE — 93010 ELECTROCARDIOGRAM REPORT: CPT | Performed by: INTERNAL MEDICINE

## 2019-06-13 PROCEDURE — 99285 EMERGENCY DEPT VISIT HI MDM: CPT

## 2019-06-13 PROCEDURE — 36415 COLL VENOUS BLD VENIPUNCTURE: CPT | Performed by: EMERGENCY MEDICINE

## 2019-06-13 PROCEDURE — 85025 COMPLETE CBC W/AUTO DIFF WBC: CPT | Performed by: EMERGENCY MEDICINE

## 2019-06-13 PROCEDURE — 93005 ELECTROCARDIOGRAM TRACING: CPT

## 2019-06-13 PROCEDURE — 71046 X-RAY EXAM CHEST 2 VIEWS: CPT

## 2019-06-13 PROCEDURE — 84484 ASSAY OF TROPONIN QUANT: CPT | Performed by: EMERGENCY MEDICINE

## 2019-06-13 PROCEDURE — 80053 COMPREHEN METABOLIC PANEL: CPT | Performed by: EMERGENCY MEDICINE

## 2019-06-13 PROCEDURE — 99283 EMERGENCY DEPT VISIT LOW MDM: CPT | Performed by: EMERGENCY MEDICINE

## 2019-06-14 VITALS
RESPIRATION RATE: 18 BRPM | OXYGEN SATURATION: 97 % | SYSTOLIC BLOOD PRESSURE: 116 MMHG | BODY MASS INDEX: 20.05 KG/M2 | DIASTOLIC BLOOD PRESSURE: 74 MMHG | WEIGHT: 143.74 LBS | TEMPERATURE: 97.4 F | HEART RATE: 57 BPM

## 2019-06-14 LAB
ATRIAL RATE: 78 BPM
P AXIS: 67 DEGREES
PR INTERVAL: 122 MS
QRS AXIS: 71 DEGREES
QRSD INTERVAL: 114 MS
QT INTERVAL: 416 MS
QTC INTERVAL: 474 MS
T WAVE AXIS: 72 DEGREES
TROPONIN I SERPL-MCNC: <0.02 NG/ML
VENTRICULAR RATE: 78 BPM

## 2019-06-14 PROCEDURE — 93010 ELECTROCARDIOGRAM REPORT: CPT | Performed by: INTERNAL MEDICINE

## 2019-06-14 PROCEDURE — 96374 THER/PROPH/DIAG INJ IV PUSH: CPT

## 2019-06-14 PROCEDURE — 36415 COLL VENOUS BLD VENIPUNCTURE: CPT | Performed by: EMERGENCY MEDICINE

## 2019-06-14 PROCEDURE — 84484 ASSAY OF TROPONIN QUANT: CPT | Performed by: EMERGENCY MEDICINE

## 2019-06-14 PROCEDURE — 93005 ELECTROCARDIOGRAM TRACING: CPT

## 2019-06-14 RX ORDER — NAPROXEN 500 MG/1
500 TABLET ORAL 2 TIMES DAILY WITH MEALS
Qty: 30 TABLET | Refills: 0 | Status: SHIPPED | OUTPATIENT
Start: 2019-06-14 | End: 2020-06-30 | Stop reason: ALTCHOICE

## 2019-06-14 RX ORDER — KETOROLAC TROMETHAMINE 30 MG/ML
15 INJECTION, SOLUTION INTRAMUSCULAR; INTRAVENOUS ONCE
Status: COMPLETED | OUTPATIENT
Start: 2019-06-14 | End: 2019-06-14

## 2019-06-14 RX ORDER — ACETAMINOPHEN, ASPIRIN AND CAFFEINE 250; 250; 65 MG/1; MG/1; MG/1
1 TABLET, FILM COATED ORAL EVERY 6 HOURS PRN
COMMUNITY

## 2019-06-14 RX ADMIN — KETOROLAC TROMETHAMINE 15 MG: 30 INJECTION, SOLUTION INTRAMUSCULAR at 01:52

## 2019-06-26 ENCOUNTER — APPOINTMENT (EMERGENCY)
Dept: RADIOLOGY | Facility: HOSPITAL | Age: 34
End: 2019-06-26
Payer: COMMERCIAL

## 2019-06-26 ENCOUNTER — HOSPITAL ENCOUNTER (EMERGENCY)
Facility: HOSPITAL | Age: 34
Discharge: HOME | End: 2019-06-26
Attending: EMERGENCY MEDICINE | Admitting: EMERGENCY MEDICINE
Payer: COMMERCIAL

## 2019-06-26 VITALS
SYSTOLIC BLOOD PRESSURE: 108 MMHG | DIASTOLIC BLOOD PRESSURE: 55 MMHG | HEART RATE: 80 BPM | RESPIRATION RATE: 14 BRPM | WEIGHT: 145.72 LBS | TEMPERATURE: 98 F | BODY MASS INDEX: 20.32 KG/M2 | OXYGEN SATURATION: 100 %

## 2019-06-26 DIAGNOSIS — R07.9 CHEST PAIN, UNSPECIFIED TYPE: Primary | ICD-10-CM

## 2019-06-26 LAB
ANION GAP SERPL CALCULATED.3IONS-SCNC: 9 MMOL/L (ref 4–13)
BASOPHILS # BLD AUTO: 0.09 THOUSANDS/ΜL (ref 0–0.1)
BASOPHILS NFR BLD AUTO: 2 % (ref 0–1)
BUN SERPL-MCNC: 8 MG/DL (ref 5–25)
CALCIUM SERPL-MCNC: 8.9 MG/DL (ref 8.3–10.1)
CHLORIDE SERPL-SCNC: 104 MMOL/L (ref 100–108)
CO2 SERPL-SCNC: 28 MMOL/L (ref 21–32)
CREAT SERPL-MCNC: 0.9 MG/DL (ref 0.6–1.3)
EOSINOPHIL # BLD AUTO: 0.02 THOUSAND/ΜL (ref 0–0.61)
EOSINOPHIL NFR BLD AUTO: 0 % (ref 0–6)
ERYTHROCYTE [DISTWIDTH] IN BLOOD BY AUTOMATED COUNT: 11.9 % (ref 11.6–15.1)
GFR SERPL CREATININE-BSD FRML MDRD: 113 ML/MIN/1.73SQ M
GLUCOSE SERPL-MCNC: 93 MG/DL (ref 65–140)
HCT VFR BLD AUTO: 42 % (ref 36.5–49.3)
HGB BLD-MCNC: 14.4 G/DL (ref 12–17)
IMM GRANULOCYTES # BLD AUTO: 0.02 THOUSAND/UL (ref 0–0.2)
IMM GRANULOCYTES NFR BLD AUTO: 0 % (ref 0–2)
LYMPHOCYTES # BLD AUTO: 1 THOUSANDS/ΜL (ref 0.6–4.47)
LYMPHOCYTES NFR BLD AUTO: 21 % (ref 14–44)
MCH RBC QN AUTO: 31.4 PG (ref 26.8–34.3)
MCHC RBC AUTO-ENTMCNC: 34.3 G/DL (ref 31.4–37.4)
MCV RBC AUTO: 92 FL (ref 82–98)
MONOCYTES # BLD AUTO: 0.27 THOUSAND/ΜL (ref 0.17–1.22)
MONOCYTES NFR BLD AUTO: 6 % (ref 4–12)
NEUTROPHILS # BLD AUTO: 3.35 THOUSANDS/ΜL (ref 1.85–7.62)
NEUTS SEG NFR BLD AUTO: 71 % (ref 43–75)
NRBC BLD AUTO-RTO: 0 /100 WBCS
PLATELET # BLD AUTO: 204 THOUSANDS/UL (ref 149–390)
PMV BLD AUTO: 10 FL (ref 8.9–12.7)
POTASSIUM SERPL-SCNC: 3.6 MMOL/L (ref 3.5–5.3)
RBC # BLD AUTO: 4.58 MILLION/UL (ref 3.88–5.62)
SODIUM SERPL-SCNC: 141 MMOL/L (ref 136–145)
TROPONIN I SERPL-MCNC: <0.02 NG/ML
WBC # BLD AUTO: 4.75 THOUSAND/UL (ref 4.31–10.16)

## 2019-06-26 PROCEDURE — 93005 ELECTROCARDIOGRAM TRACING: CPT

## 2019-06-26 PROCEDURE — 71046 X-RAY EXAM CHEST 2 VIEWS: CPT

## 2019-06-26 PROCEDURE — 80048 BASIC METABOLIC PNL TOTAL CA: CPT | Performed by: EMERGENCY MEDICINE

## 2019-06-26 PROCEDURE — 84484 ASSAY OF TROPONIN QUANT: CPT | Performed by: EMERGENCY MEDICINE

## 2019-06-26 PROCEDURE — 99284 EMERGENCY DEPT VISIT MOD MDM: CPT | Performed by: EMERGENCY MEDICINE

## 2019-06-26 PROCEDURE — 36415 COLL VENOUS BLD VENIPUNCTURE: CPT | Performed by: EMERGENCY MEDICINE

## 2019-06-26 PROCEDURE — 85025 COMPLETE CBC W/AUTO DIFF WBC: CPT | Performed by: EMERGENCY MEDICINE

## 2019-06-26 PROCEDURE — 96374 THER/PROPH/DIAG INJ IV PUSH: CPT

## 2019-06-26 PROCEDURE — 99285 EMERGENCY DEPT VISIT HI MDM: CPT

## 2019-06-26 RX ORDER — KETOROLAC TROMETHAMINE 30 MG/ML
15 INJECTION, SOLUTION INTRAMUSCULAR; INTRAVENOUS ONCE
Status: COMPLETED | OUTPATIENT
Start: 2019-06-26 | End: 2019-06-26

## 2019-06-26 RX ADMIN — KETOROLAC TROMETHAMINE 15 MG: 30 INJECTION, SOLUTION INTRAMUSCULAR at 21:09

## 2019-06-27 LAB
ATRIAL RATE: 86 BPM
P AXIS: 63 DEGREES
PR INTERVAL: 134 MS
QRS AXIS: 60 DEGREES
QRSD INTERVAL: 116 MS
QT INTERVAL: 374 MS
QTC INTERVAL: 437 MS
T WAVE AXIS: 67 DEGREES
VENTRICULAR RATE: 82 BPM

## 2019-06-27 PROCEDURE — 93010 ELECTROCARDIOGRAM REPORT: CPT | Performed by: INTERNAL MEDICINE

## 2019-06-27 NOTE — ED PROVIDER NOTES
History  Chief Complaint   Patient presents with    Chest Pain     pt  comes in via EMS with c/o chest pain and shortness of breath and tingling in the left arm and some burning in the throat  Pt  describes the pain as stabbing  Pt  also states this has been an on going issue for the past 2 years  Denies any n/v/d, headache or dizziness  History provided by:  Patient   used: No    Chest Pain   Associated symptoms: numbness    Associated symptoms: no abdominal pain, no cough, no diaphoresis, no dizziness, no fever, no headache, no nausea, no palpitations, no shortness of breath, not vomiting and no weakness      Patient is a 68-year-old male presenting to emergency department left-sided chest pain, on off for the last 2 years  Today's which he with numbness and tingling on both side of the face  Associated tingling in the left arm  No shortness of breath  No weakness  No nausea or vomiting  No back pain  No shortness of breath  No fevers or chills  Seen Cardiology for this in the past   Told it is not cardiac cause  No PE risk factors  No trauma  Also had some tingling in his throat which resolved  No rashes  No history allergic reaction  History of anxiety  MDM EKG, troponin, chest x-ray, PCP follow-up        Prior to Admission Medications   Prescriptions Last Dose Informant Patient Reported? Taking?    PARoxetine (PAXIL) 10 mg tablet Not Taking at Unknown time  Yes No   Sig: Take 10 mg by mouth daily   aspirin (ECOTRIN LOW STRENGTH) 81 mg EC tablet Not Taking at Unknown time Self Yes No   Sig: Take 81 mg by mouth daily   aspirin-acetaminophen-caffeine (EXCEDRIN MIGRAINE) 250-250-65 MG per tablet Not Taking at Unknown time  Yes No   Sig: Take 1 tablet by mouth every 6 (six) hours as needed for headaches   bisacodyl (DULCOLAX) 5 mg EC tablet Not Taking at Unknown time  Yes No   Sig: Take 5 mg by mouth daily as needed for constipation   diclofenac sodium (VOLTAREN) 1 % Not Taking at Unknown time  No No   Sig: Apply 2 g topically 4 (four) times a day As needed for chest wall pain   Patient not taking: Reported on 6/26/2019   multivitamin (Deborah Alert) TABS Not Taking at Unknown time Self Yes No   Sig: Take 1 tablet by mouth daily   naproxen (NAPROSYN) 500 mg tablet Not Taking at Unknown time  No No   Sig: Take 1 tablet (500 mg total) by mouth 2 (two) times a day with meals As needed for chest wall pain   Patient not taking: Reported on 6/26/2019      Facility-Administered Medications: None       Past Medical History:   Diagnosis Date    Anxiety     Herpes        History reviewed  No pertinent surgical history  Family History   Problem Relation Age of Onset    Clotting disorder Mother     Fibromyalgia Mother     Hyperlipidemia Mother     Clotting disorder Father     Hypertension Father     Hyperlipidemia Father     Stroke Paternal Grandmother     Heart attack Paternal Grandfather     Aneurysm Neg Hx     Arrhythmia Neg Hx      I have reviewed and agree with the history as documented  Social History     Tobacco Use    Smoking status: Light Tobacco Smoker    Smokeless tobacco: Current User     Types: Chew   Substance Use Topics    Alcohol use: Yes     Comment: social     Drug use: No        Review of Systems   Constitutional: Negative for chills, diaphoresis and fever  HENT: Negative for congestion and sore throat  Respiratory: Negative for cough, shortness of breath, wheezing and stridor  Cardiovascular: Positive for chest pain  Negative for palpitations and leg swelling  Gastrointestinal: Negative for abdominal pain, blood in stool, diarrhea, nausea and vomiting  Genitourinary: Negative for dysuria, frequency and urgency  Musculoskeletal: Negative for neck pain and neck stiffness  Skin: Negative for pallor and rash  Neurological: Positive for numbness  Negative for dizziness, syncope, weakness, light-headedness and headaches     All other systems reviewed and are negative  Physical Exam  Physical Exam   Constitutional: He is oriented to person, place, and time  He appears well-developed and well-nourished  HENT:   Head: Normocephalic and atraumatic  Eyes: Pupils are equal, round, and reactive to light  Neck: Neck supple  Cardiovascular: Normal rate, regular rhythm, normal heart sounds and intact distal pulses  Pulmonary/Chest: Effort normal and breath sounds normal  No respiratory distress  Abdominal: Soft  Bowel sounds are normal  There is no tenderness  Musculoskeletal: Normal range of motion  Right lower leg: Normal  He exhibits no tenderness and no edema  Left lower leg: Normal  He exhibits no tenderness and no edema  Neurological: He is alert and oriented to person, place, and time  Skin: Skin is warm and dry  Capillary refill takes less than 2 seconds  Vitals reviewed        Vital Signs  ED Triage Vitals   Temperature Pulse Respirations Blood Pressure SpO2   06/26/19 2014 06/26/19 2014 06/26/19 2014 06/26/19 2014 06/26/19 2014   98 °F (36 7 °C) 80 14 108/55 100 %      Temp Source Heart Rate Source Patient Position - Orthostatic VS BP Location FiO2 (%)   06/26/19 2014 06/26/19 2014 06/26/19 2014 06/26/19 2014 --   Oral Monitor Lying Right arm       Pain Score       06/26/19 2109       1           Vitals:    06/26/19 2014   BP: 108/55   Pulse: 80   Patient Position - Orthostatic VS: Lying         Visual Acuity      ED Medications  Medications   ketorolac (TORADOL) injection 15 mg (15 mg Intravenous Given 6/26/19 2109)       Diagnostic Studies  Results Reviewed     Procedure Component Value Units Date/Time    Troponin I [[id number]]  (Normal) Collected:  06/26/19 2033    Lab Status:  Final result Specimen:  Blood from Arm, Left Updated:  06/26/19 2059     Troponin I <0 02 ng/mL     Basic metabolic panel [[id number]] Collected:  06/26/19 2033    Lab Status:  Final result Specimen:  Blood from Arm, Left Updated:  06/26/19 2051     Sodium 141 mmol/L      Potassium 3 6 mmol/L      Chloride 104 mmol/L      CO2 28 mmol/L      ANION GAP 9 mmol/L      BUN 8 mg/dL      Creatinine 0 90 mg/dL      Glucose 93 mg/dL      Calcium 8 9 mg/dL      eGFR 113 ml/min/1 73sq m     Narrative:       [place] guidelines for Chronic Kidney Disease (CKD):     Stage 1 with normal or high GFR (GFR > 90 mL/min/1 73 square meters)    Stage 2 Mild CKD (GFR = 60-89 mL/min/1 73 square meters)    Stage 3A Moderate CKD (GFR = 45-59 mL/min/1 73 square meters)    Stage 3B Moderate CKD (GFR = 30-44 mL/min/1 73 square meters)    Stage 4 Severe CKD (GFR = 15-29 mL/min/1 73 square meters)    Stage 5 End Stage CKD (GFR <15 mL/min/1 73 square meters)  Note: GFR calculation is accurate only with a steady state creatinine    CBC and differential [[id number]]  (Abnormal) Collected:  06/26/19 2033    Lab Status:  Final result Specimen:  Blood from Arm, Left Updated:  06/26/19 2040     WBC 4 75 Thousand/uL      RBC 4 58 Million/uL      Hemoglobin 14 4 g/dL      Hematocrit 42 0 %      MCV 92 fL      MCH 31 4 pg      MCHC 34 3 g/dL      RDW 11 9 %      MPV 10 0 fL      Platelets 923 Thousands/uL      nRBC 0 /100 WBCs      Neutrophils Relative 71 %      Immat GRANS % 0 %      Lymphocytes Relative 21 %      Monocytes Relative 6 %      Eosinophils Relative 0 %      Basophils Relative 2 %      Neutrophils Absolute 3 35 Thousands/µL      Immature Grans Absolute 0 02 Thousand/uL      Lymphocytes Absolute 1 00 Thousands/µL      Monocytes Absolute 0 27 Thousand/µL      Eosinophils Absolute 0 02 Thousand/µL      Basophils Absolute 0 09 Thousands/µL                  XR chest 2 views   Final Result by Yen Minor MD (06/26 2205)      No acute cardiopulmonary disease              Workstation performed: [id number]                    Procedures  Procedures       ED Course  ED Course as of Jun 26 2240 Wed Jun 26, 2019 2024 ECG shows rate of 82, sinus, normal axis, normal QRS, no significant ST or T-wave changes, no significant changes from previous EKG, normal intervals, independently interpreted by me            HEART Risk Score      Most Recent Value   History  0 Filed at: 06/26/2019 2120   ECG  0 Filed at: 06/26/2019 2120   Age  0 Filed at: 06/26/2019 2120   Risk Factors  0 Filed at: 06/26/2019 2120   Troponin  0 Filed at: 06/26/2019 2120   Heart Score Risk Calculator   History  0 Filed at: 06/26/2019 2120   ECG  0 Filed at: 06/26/2019 2120   Age  0 Filed at: 06/26/2019 2120   Risk Factors  0 Filed at: 06/26/2019 2120   Troponin  0 Filed at: 06/26/2019 2120   HEART Score  0 Filed at: 06/26/2019 2120   HEART Score  0 Filed at: 06/26/2019 2120            PERC Rule for PE      Most Recent Value   PERC Rule for PE   Age >=50  0 Filed at: 06/26/2019 2029   HR >=100  0 Filed at: 06/26/2019 2029   O2 Sat on room air < 95%  0 Filed at: 06/26/2019 2029   History of PE or DVT  0 Filed at: 06/26/2019 2029   Recent trauma or surgery  0 Filed at: 06/26/2019 2029   Hemoptysis  0 Filed at: 06/26/2019 2029   Exogenous estrogen  0 Filed at: 06/26/2019 2029   Unilateral leg swelling  0 Filed at: 06/26/2019 2029   PERC Rule for PE Results  0 Filed at: 06/26/2019 2029                Illangel Leonard' Criteria for PE      Most Recent Value   Wells' Criteria for PE   Clinical signs and symptoms of DVT  0 Filed at: 06/26/2019 2029   PE is primary diagnosis or equally likely  0 Filed at: 06/26/2019 2029   HR >100  0 Filed at: 06/26/2019 2029   Immobilization at least 3 days or Surgery in the previous 4 weeks  0 Filed at: 06/26/2019 2029   Previous, objectively diagnosed PE or DVT  0 Filed at: 06/26/2019 2029   Hemoptysis  0 Filed at: 06/26/2019 2029   Malignancy with treatment within 6 months or palliative  0 Filed at: 06/26/2019 2029   Illona Leonard' Criteria Total  0 Filed at: 06/26/2019 2029            [facility]    Disposition  Final diagnoses:   Chest pain, unspecified type     Time reflects when diagnosis was documented in both MDM as applicable and the Disposition within this note     Time User Action Codes Description Comment    6/26/2019  9:21 PM Mayuri Damian Add [R07 9] Chest pain, unspecified type       ED Disposition     ED Disposition Condition Date/Time Comment    Discharge Stable Wed Jun 26, 2019  9:21 PM Alireza Marie discharge to home/self care  Follow-up Information     Follow up With Specialties Details Why Contact Info Additional [facility] [place],  Family Medicine In 2 days Re-evaluation [address] [facility] [address] [phone]       [address] Emergency Department Emergency Medicine  As needed, If symptoms worsen [address] [facility] [address] [phone] [facility] ED, [address], [place], [place], [address]          Discharge Medication List as of 6/26/2019  9:21 PM      CONTINUE these medications which have NOT CHANGED    Details   aspirin (ECOTRIN LOW STRENGTH) 81 mg EC tablet Take 81 mg by mouth daily, Historical Med      aspirin-acetaminophen-caffeine (EXCEDRIN MIGRAINE) 250-250-65 MG per tablet Take 1 tablet by mouth every 6 (six) hours as needed for headaches, Historical Med      bisacodyl (DULCOLAX) 5 mg EC tablet Take 5 mg by mouth daily as needed for constipation, Historical Med      diclofenac sodium (VOLTAREN) 1 % Apply 2 g topically 4 (four) times a day As needed for chest wall pain, Starting Fri 6/14/2019, Print      multivitamin (THERAGRAN) TABS Take 1 tablet by mouth daily, Historical Med      naproxen (NAPROSYN) 500 mg tablet Take 1 tablet (500 mg total) by mouth 2 (two) times a day with meals As needed for chest wall pain, Starting Fri 6/14/2019, Print      PARoxetine (PAXIL) 10 mg tablet Take 10 mg by mouth daily, Historical Med           No discharge procedures on file      ED Provider  Electronically Signed by           Shirley Melgar MD  06/26/19 6022

## 2020-06-30 ENCOUNTER — HOSPITAL ENCOUNTER (EMERGENCY)
Facility: HOSPITAL | Age: 35
Discharge: HOME | End: 2020-06-30
Attending: EMERGENCY MEDICINE | Admitting: EMERGENCY MEDICINE
Payer: COMMERCIAL

## 2020-06-30 VITALS
DIASTOLIC BLOOD PRESSURE: 80 MMHG | BODY MASS INDEX: 20.45 KG/M2 | SYSTOLIC BLOOD PRESSURE: 133 MMHG | RESPIRATION RATE: 17 BRPM | WEIGHT: 146.61 LBS | TEMPERATURE: 98 F | HEART RATE: 75 BPM | OXYGEN SATURATION: 98 %

## 2020-06-30 DIAGNOSIS — R07.9 CHEST PAIN: ICD-10-CM

## 2020-06-30 DIAGNOSIS — M79.609 PARESTHESIA AND PAIN OF LEFT EXTREMITY: ICD-10-CM

## 2020-06-30 DIAGNOSIS — F41.0 PANIC ATTACK: ICD-10-CM

## 2020-06-30 DIAGNOSIS — R20.2 PARESTHESIA AND PAIN OF LEFT EXTREMITY: ICD-10-CM

## 2020-06-30 DIAGNOSIS — F41.9 ANXIETY: Primary | ICD-10-CM

## 2020-06-30 LAB
AMPHETAMINES SERPL QL SCN: NEGATIVE
ANION GAP SERPL CALCULATED.3IONS-SCNC: 5 MMOL/L (ref 4–13)
BARBITURATES UR QL: NEGATIVE
BASOPHILS # BLD AUTO: 0.04 THOUSANDS/ΜL (ref 0–0.1)
BASOPHILS NFR BLD AUTO: 1 % (ref 0–1)
BENZODIAZ UR QL: NEGATIVE
BUN SERPL-MCNC: 8 MG/DL (ref 5–25)
CALCIUM SERPL-MCNC: 8.6 MG/DL (ref 8.3–10.1)
CHLORIDE SERPL-SCNC: 105 MMOL/L (ref 100–108)
CO2 SERPL-SCNC: 28 MMOL/L (ref 21–32)
COCAINE UR QL: NEGATIVE
CREAT SERPL-MCNC: 0.86 MG/DL (ref 0.6–1.3)
EOSINOPHIL # BLD AUTO: 0.04 THOUSAND/ΜL (ref 0–0.61)
EOSINOPHIL NFR BLD AUTO: 1 % (ref 0–6)
ERYTHROCYTE [DISTWIDTH] IN BLOOD BY AUTOMATED COUNT: 12.1 % (ref 11.6–15.1)
ETHANOL EXG-MCNC: 0 MG/DL
GFR SERPL CREATININE-BSD FRML MDRD: 115 ML/MIN/1.73SQ M
GLUCOSE SERPL-MCNC: 90 MG/DL (ref 65–140)
HCT VFR BLD AUTO: 42.8 % (ref 36.5–49.3)
HGB BLD-MCNC: 14.7 G/DL (ref 12–17)
IMM GRANULOCYTES # BLD AUTO: 0 THOUSAND/UL (ref 0–0.2)
IMM GRANULOCYTES NFR BLD AUTO: 0 % (ref 0–2)
LYMPHOCYTES # BLD AUTO: 1.08 THOUSANDS/ΜL (ref 0.6–4.47)
LYMPHOCYTES NFR BLD AUTO: 22 % (ref 14–44)
MCH RBC QN AUTO: 31.9 PG (ref 26.8–34.3)
MCHC RBC AUTO-ENTMCNC: 34.3 G/DL (ref 31.4–37.4)
MCV RBC AUTO: 93 FL (ref 82–98)
METHADONE UR QL: NEGATIVE
MONOCYTES # BLD AUTO: 0.31 THOUSAND/ΜL (ref 0.17–1.22)
MONOCYTES NFR BLD AUTO: 6 % (ref 4–12)
NEUTROPHILS # BLD AUTO: 3.46 THOUSANDS/ΜL (ref 1.85–7.62)
NEUTS SEG NFR BLD AUTO: 70 % (ref 43–75)
NRBC BLD AUTO-RTO: 0 /100 WBCS
OPIATES UR QL SCN: NEGATIVE
OXYCODONE+OXYMORPHONE UR QL SCN: NEGATIVE
PCP UR QL: NEGATIVE
PLATELET # BLD AUTO: 201 THOUSANDS/UL (ref 149–390)
PMV BLD AUTO: 9.7 FL (ref 8.9–12.7)
POTASSIUM SERPL-SCNC: 3.8 MMOL/L (ref 3.5–5.3)
RBC # BLD AUTO: 4.61 MILLION/UL (ref 3.88–5.62)
SODIUM SERPL-SCNC: 138 MMOL/L (ref 136–145)
THC UR QL: NEGATIVE
TROPONIN I SERPL-MCNC: <0.02 NG/ML
TSH SERPL DL<=0.05 MIU/L-ACNC: 0.46 UIU/ML (ref 0.36–3.74)
WBC # BLD AUTO: 4.93 THOUSAND/UL (ref 4.31–10.16)

## 2020-06-30 PROCEDURE — 80048 BASIC METABOLIC PNL TOTAL CA: CPT | Performed by: EMERGENCY MEDICINE

## 2020-06-30 PROCEDURE — 93005 ELECTROCARDIOGRAM TRACING: CPT

## 2020-06-30 PROCEDURE — 99284 EMERGENCY DEPT VISIT MOD MDM: CPT

## 2020-06-30 PROCEDURE — 82075 ASSAY OF BREATH ETHANOL: CPT | Performed by: EMERGENCY MEDICINE

## 2020-06-30 PROCEDURE — 84443 ASSAY THYROID STIM HORMONE: CPT | Performed by: EMERGENCY MEDICINE

## 2020-06-30 PROCEDURE — 85025 COMPLETE CBC W/AUTO DIFF WBC: CPT | Performed by: EMERGENCY MEDICINE

## 2020-06-30 PROCEDURE — 36415 COLL VENOUS BLD VENIPUNCTURE: CPT | Performed by: EMERGENCY MEDICINE

## 2020-06-30 PROCEDURE — 99283 EMERGENCY DEPT VISIT LOW MDM: CPT | Performed by: EMERGENCY MEDICINE

## 2020-06-30 PROCEDURE — 80307 DRUG TEST PRSMV CHEM ANLYZR: CPT | Performed by: EMERGENCY MEDICINE

## 2020-06-30 PROCEDURE — 84484 ASSAY OF TROPONIN QUANT: CPT | Performed by: EMERGENCY MEDICINE

## 2020-06-30 NOTE — ED PROVIDER NOTES
History  Chief Complaint   Patient presents with    Panic Attack     Pt states that he has been having panic attacks and today his left arm went numb  Pt states that he is under a lot of stress at this time  72-year-old male presents the emergency department for evaluation of left arm pain and numbness  Patient states that he has been having severe anxiety and frequent panic attacks  He states that he will get a pain along the left lower chest wall that shoots down his side and into his arm prior to the onset of his panic attacks  Patient states that he has become very frequent over the past 2 weeks  Today he had pain in his chest that went into his left arm and he felt that his left arm was becoming numb  This lasted for approximately 1 hour  Shortly after developing the symptoms began to have a panic attack  He states when he is having the attack he feels like he cannot catch his breath but he is unsure if he is hyperventilating  Patient states that he has been experiencing panic attacks for approximately 2 years  Over the past 2 weeks it got much worse could he is currently going through a separation from his significant other  Patient was evaluated by his PCP and started on Paxil  He states that his medication interfered with his job because made him too sleepy and he discontinued it  He states he took it for approximately 3 months but not feel that is panic or anxiety was improved  Is also given p r n  Medication to use during acute panic attacks but he states that he has not taken  He states that his family has a history of addiction to pills and he did not want to become an addict  Patient is not suicidal or homicidal   He has had previous workups for his chest pain that were unremarkable  Patient states that he has been drinking alcohol normally a few drinks before bed to help with sleep  He does admit to drinking more alcohol recently        History provided by:  Patient and medical records   used: No    Panic Attack   Presenting symptoms: no homicidal ideas, no suicidal thoughts, no suicidal threats and no suicide attempt    Presenting symptoms comment:  Severe anxiety and panic attacks  Degree of incapacity (severity):  Severe  Onset quality:  Sudden  Timing:  Intermittent  Progression:  Unchanged  Chronicity:  Chronic  Context: alcohol use and stressful life event    Context: not drug abuse    Treatment compliance:  Untreated  Relieved by:  Nothing  Worsened by:  Family interactions and alcohol  Ineffective treatments:  Anti-anxiety medications and sleep  Associated symptoms: anxiety, chest pain and insomnia    Associated symptoms: no appetite change, no fatigue, no feelings of worthlessness, no headaches, no irritability, no poor judgment, no school problems and no weight change    Risk factors: no hx of suicide attempts and no recent psychiatric admission    Chest Pain   Associated symptoms: numbness and weakness    Associated symptoms: no fatigue and no headache        Prior to Admission Medications   Prescriptions Last Dose Informant Patient Reported? Taking? PARoxetine (PAXIL) 10 mg tablet Not Taking at Unknown time  Yes No   Sig: Take 10 mg by mouth daily   aspirin-acetaminophen-caffeine (EXCEDRIN MIGRAINE) 250-250-65 MG per tablet Past Month at Unknown time  Yes Yes   Sig: Take 1 tablet by mouth every 6 (six) hours as needed for headaches   bisacodyl (DULCOLAX) 5 mg EC tablet Past Month at Unknown time  Yes Yes   Sig: Take 5 mg by mouth daily as needed for constipation   multivitamin (THERAGRAN) TABS Not Taking at Unknown time Self Yes No   Sig: Take 1 tablet by mouth daily      Facility-Administered Medications: None       Past Medical History:   Diagnosis Date    Anxiety     Herpes        History reviewed  No pertinent surgical history      Family History   Problem Relation Age of Onset    Clotting disorder Mother     Fibromyalgia Mother     Hyperlipidemia Mother     Clotting disorder Father     Hypertension Father     Hyperlipidemia Father     Stroke Paternal Grandmother     Heart attack Paternal Grandfather     Aneurysm Neg Hx     Arrhythmia Neg Hx      I have reviewed and agree with the history as documented  E-Cigarette/Vaping     E-Cigarette/Vaping Substances     Social History     Tobacco Use    Smoking status: Never Smoker    Smokeless tobacco: Current User     Types: Chew   Substance Use Topics    Alcohol use: Yes     Frequency: 4 or more times a week     Drinks per session: 1 or 2     Comment: social     Drug use: No       Review of Systems   Constitutional: Negative for appetite change, fatigue and irritability  Respiratory: Positive for chest tightness  Cardiovascular: Positive for chest pain  Genitourinary: Negative for dysuria  Neurological: Positive for weakness and numbness  Negative for headaches  Psychiatric/Behavioral: Positive for dysphoric mood  Negative for homicidal ideas and suicidal ideas  The patient is nervous/anxious and has insomnia  All other systems reviewed and are negative  Physical Exam  Physical Exam   Constitutional: He is oriented to person, place, and time  Vital signs are normal  He appears well-developed and well-nourished  HENT:   Head: Normocephalic and atraumatic  Eyes: Pupils are equal, round, and reactive to light  Conjunctivae and EOM are normal    Neck: Normal range of motion  Neck supple  Cardiovascular: Normal rate, regular rhythm, normal heart sounds and intact distal pulses  Pulmonary/Chest: Effort normal and breath sounds normal  No accessory muscle usage  No respiratory distress  He exhibits no tenderness  Abdominal: Soft  Normal appearance and bowel sounds are normal  He exhibits no distension  There is no tenderness  There is no rebound and no guarding  Musculoskeletal: Normal range of motion  He exhibits no edema, tenderness or deformity          Arms:  Lymphadenopathy: He has no cervical adenopathy  Neurological: He is alert and oriented to person, place, and time  He has normal strength and normal reflexes  Coordination normal    Skin: Skin is warm, dry and intact  No rash noted  Psychiatric: His speech is normal  Judgment and thought content normal  His mood appears anxious  He is withdrawn  Cognition and memory are normal  He exhibits a depressed mood  Vitals reviewed  Vital Signs  ED Triage Vitals   Temperature Pulse Respirations Blood Pressure SpO2   06/30/20 1705 06/30/20 1705 06/30/20 1705 06/30/20 1705 06/30/20 1705   98 °F (36 7 °C) 71 18 129/75 99 %      Temp Source Heart Rate Source Patient Position - Orthostatic VS BP Location FiO2 (%)   06/30/20 1705 06/30/20 1705 06/30/20 1930 06/30/20 1705 --   Oral Monitor Sitting Right arm       Pain Score       06/30/20 1705       2           Vitals:    06/30/20 1705 06/30/20 1930   BP: 129/75 133/80   Pulse: 71 75   Patient Position - Orthostatic VS:  Sitting         Visual Acuity      ED Medications  Medications - No data to display    Diagnostic Studies  Results Reviewed     Procedure Component Value Units Date/Time    Rapid drug screen, urine [[id number]]  (Normal) Collected:  06/30/20 1815    Lab Status:  Final result Specimen:  Urine Updated:  06/30/20 1852     Amph/Meth UR Negative     Barbiturate Ur Negative     Benzodiazepine Urine Negative     Cocaine Urine Negative     Methadone Urine Negative     Opiate Urine Negative     PCP Ur Negative     THC Urine Negative     Oxycodone Urine Negative    Narrative:       FOR MEDICAL PURPOSES ONLY  IF CONFIRMATION NEEDED PLEASE CONTACT THE LAB WITHIN 5 DAYS      Drug Screen Cutoff Levels:  AMPHETAMINE/METHAMPHETAMINES  1000 ng/mL  BARBITURATES     200 ng/mL  BENZODIAZEPINES     200 ng/mL  COCAINE      300 ng/mL  METHADONE      300 ng/mL  OPIATES      300 ng/mL  PHENCYCLIDINE     25 ng/mL  THC       50 ng/mL  OXYCODONE      100 ng/mL    Basic metabolic panel [[id number]] Collected:  06/30/20 1740    Lab Status:  Final result Specimen:  Blood from Arm, Right Updated:  06/30/20 1820     Sodium 138 mmol/L      Potassium 3 8 mmol/L      Chloride 105 mmol/L      CO2 28 mmol/L      ANION GAP 5 mmol/L      BUN 8 mg/dL      Creatinine 0 86 mg/dL      Glucose 90 mg/dL      Calcium 8 6 mg/dL      eGFR 115 ml/min/1 73sq m     Narrative:       [place] guidelines for Chronic Kidney Disease (CKD):     Stage 1 with normal or high GFR (GFR > 90 mL/min/1 73 square meters)    Stage 2 Mild CKD (GFR = 60-89 mL/min/1 73 square meters)    Stage 3A Moderate CKD (GFR = 45-59 mL/min/1 73 square meters)    Stage 3B Moderate CKD (GFR = 30-44 mL/min/1 73 square meters)    Stage 4 Severe CKD (GFR = 15-29 mL/min/1 73 square meters)    Stage 5 End Stage CKD (GFR <15 mL/min/1 73 square meters)  Note: GFR calculation is accurate only with a steady state creatinine    TSH [[id number]]  (Normal) Collected:  06/30/20 1740    Lab Status:  Final result Specimen:  Blood from Arm, Right Updated:  06/30/20 1820     TSH 3RD GENERATON 0 462 uIU/mL     Narrative:       Patients undergoing fluorescein dye angiography may retain small amounts of fluorescein in the body for 48-72 hours post procedure  Samples containing fluorescein can produce falsely depressed TSH values  If the patient had this procedure,a specimen should be resubmitted post fluorescein clearance        Troponin I [[id number]]  (Normal) Collected:  06/30/20 1740    Lab Status:  Final result Specimen:  Blood from Arm, Right Updated:  06/30/20 1812     Troponin I <0 02 ng/mL     POCT alcohol breath test [[id number]]  (Normal) Resulted:  06/30/20 1806    Lab Status:  Final result Updated:  06/30/20 1806     EXTBreath Alcohol 0 00    CBC and differential [[id number]] Collected:  06/30/20 1740    Lab Status:  Final result Specimen:  Blood from Arm, Right Updated:  06/30/20 1751     WBC 4 93 Thousand/uL      RBC 4 61 Million/uL      Hemoglobin 14 7 g/dL      Hematocrit 42 8 %      MCV 93 fL      MCH 31 9 pg      MCHC 34 3 g/dL      RDW 12 1 %      MPV 9 7 fL      Platelets 584 Thousands/uL      nRBC 0 /100 WBCs      Neutrophils Relative 70 %      Immat GRANS % 0 %      Lymphocytes Relative 22 %      Monocytes Relative 6 %      Eosinophils Relative 1 %      Basophils Relative 1 %      Neutrophils Absolute 3 46 Thousands/µL      Immature Grans Absolute 0 00 Thousand/uL      Lymphocytes Absolute 1 08 Thousands/µL      Monocytes Absolute 0 31 Thousand/µL      Eosinophils Absolute 0 04 Thousand/µL      Basophils Absolute 0 04 Thousands/µL                  No orders to display              Procedures  ECG 12 Lead Documentation Only  Date/Time: 6/30/2020 6:21 PM  Performed by: Clive Ravi DO  Authorized by: Clive Ravi DO     Indications / Diagnosis:  Chest pain, anxiety  ECG reviewed by me, the ED Provider: yes    Patient location:  ED  Previous ECG:     Previous ECG:  Compared to current    Comparison ECG info:  June 26, 2020    Similarity:  No change  Interpretation:     Interpretation: non-specific    Rate:     ECG rate:  64    ECG rate assessment: normal    Rhythm:     Rhythm: sinus rhythm    Ectopy:     Ectopy: none    QRS:     QRS axis:  Normal  Conduction:     Conduction: abnormal      Abnormal conduction: incomplete RBBB    ST segments:     ST segments:  Normal             ED Course  ED Course as of Jun 30 2125   Tue Jun 30, 2020 2024 Patient evaluated by Lyubov Sandhu with the ED crisis team   She will provide him with outpatient resources  US AUDIT      Most Recent Value   Initial Alcohol Screen: US AUDIT-C    1  How often do you have a drink containing alcohol?  0 Filed at: 06/30/2020 1906   2  How many drinks containing alcohol do you have on a typical day you are drinking? 0 Filed at: 06/30/2020 1906   3a  Male UNDER 65: How often do you have five or more drinks on one occasion? 0 Filed at: 06/30/2020 1906   3b  FEMALE Any Age, or MALE 65+: How often do you have 4 or more drinks on one occassion? 0 Filed at: 06/30/2020 1906   Audit-C Score  0 Filed at: 06/30/2020 1906                  NORMA/DAST-10      Most Recent Value   How many times in the past year have you    Used an illegal drug or used a prescription medication for non-medical reasons? Never Filed at: 06/30/2020 1906          PERC Rule for PE      Most Recent Value   PERC Rule for PE   Age >=50  0 Filed at: 06/30/2020 1731   HR >=100  0 Filed at: 06/30/2020 1731   O2 Sat on room air < 95%  0 Filed at: 06/30/2020 1731   History of PE or DVT  0 Filed at: 06/30/2020 1731   Recent trauma or surgery  0 Filed at: 06/30/2020 1731   Hemoptysis  0 Filed at: 06/30/2020 1731   Exogenous estrogen  0 Filed at: 06/30/2020 1731   Unilateral leg swelling  0 Filed at: 06/30/2020 1731   PERC Rule for PE Results  0 Filed at: 06/30/2020 1731                            MDM  Number of Diagnoses or Management Options  Anxiety: new and requires workup  Chest pain: new and requires workup  Panic attack: new and requires workup  Paresthesia and pain of left extremity: new and requires workup     Amount and/or Complexity of Data Reviewed  Clinical lab tests: ordered and reviewed  Tests in the radiology section of CPT®: reviewed  Tests in the medicine section of CPT®: reviewed and ordered  Decide to obtain previous medical records or to obtain history from someone other than the patient: yes  Discuss the patient with other providers: yes  Independent visualization of images, tracings, or specimens: yes    Risk of Complications, Morbidity, and/or Mortality  General comments: 51-year-old male presents with recurrent somatic symptoms associated with his panic attacks  Patient's workup is reassuring  EKG is normal and troponin is negative  Patient has a clear lung exam and a normal physical exam of the left upper extremity  Patient admits to severe anxiety and panic    He was evaluated by our crisis worker and was provided with outpatient resources  Patient does acknowledge that he may benefit from psychotherapy  He is hesitant to taking medications at this time due to concern for sedation side effect though is willing to discuss this with his PCP  Patient has no homicidal or suicidal ideations  Patient felt comfortable with discharge plan  Discussed signs and symptoms return to the emergency department  Patient Progress  Patient progress: improved        Disposition  Final diagnoses:   Anxiety   Panic attack   Chest pain   Paresthesia and pain of left extremity - left upper     Time reflects when diagnosis was documented in both MDM as applicable and the Disposition within this note     Time User Action Codes Description Comment    6/30/2020  8:22 PM Jamineptali Ballard Add [F41 9] Anxiety     6/30/2020  8:22 PM Charlotte Soler Add [F41 0] Panic attack     6/30/2020  8:22 PM Charlotte Soler Add [R07 9] Chest pain     6/30/2020  8:22 PM WhiteCharlotte Add [M79 609,  R20 2] Paresthesia and pain of left extremity     6/30/2020  8:22 PM Jami Ballard Modify [W00 393,  R20 2] Paresthesia and pain of left extremity left upper      ED Disposition     ED Disposition Condition Date/Time Comment    Discharge Stable Tue Jun 30, 2020  8:18 PM Robel Zepeda discharge to home/self care  Follow-up Information     Follow up With Specialties Details Why Contact Zayda Martinez DO Family Medicine Schedule an appointment as soon as possible for a visit in 1 day For recheck of current symptoms [address] [address] [phone]            Discharge Medication List as of 6/30/2020  8:23 PM      CONTINUE these medications which have NOT CHANGED    Details   aspirin-acetaminophen-caffeine (EXCEDRIN MIGRAINE) 250-250-65 MG per tablet Take 1 tablet by mouth every 6 (six) hours as needed for headaches, Historical Med      bisacodyl (DULCOLAX) 5 mg EC tablet Take 5 mg by mouth daily as needed for constipation, Historical Med      multivitamin (THERAGRAN) TABS Take 1 tablet by mouth daily, Historical Med      PARoxetine (PAXIL) 10 mg tablet Take 10 mg by mouth daily, Historical Med           No discharge procedures on file      PDMP Review     None          ED Provider  Electronically Signed by           Ana Narvaez DO  06/30/20 6514

## 2020-06-30 NOTE — ED NOTES
Crisis is aware of patient and will notify them once medically cleared          Uzma Cervantes RN  06/30/20 [id number]

## 2020-07-01 LAB
ATRIAL RATE: 64 BPM
P AXIS: 49 DEGREES
PR INTERVAL: 118 MS
QRS AXIS: 79 DEGREES
QRSD INTERVAL: 110 MS
QT INTERVAL: 402 MS
QTC INTERVAL: 414 MS
T WAVE AXIS: 65 DEGREES
VENTRICULAR RATE: 64 BPM

## 2020-07-01 PROCEDURE — 93010 ELECTROCARDIOGRAM REPORT: CPT | Performed by: INTERNAL MEDICINE

## 2020-07-01 NOTE — ED NOTES
Intake / safety assessment completed with patient who presents to ED from work due to [address] attack  Patient reports having increased anxiety  States he has been under stress which is related to his work and recent seperation from his significant other  Patient denies wanting to harm self or any one else  He also denies any hallucinations  Patient denies any prior treatment for behavioral health  Patient he feels safe to go home  He was provided with outpatient resources  Patient to be discharged home which Dr was in agreement with  Gerhardt Sep

## 2020-11-23 ENCOUNTER — NURSE TRIAGE (OUTPATIENT)
Dept: OTHER | Facility: OTHER | Age: 35
End: 2020-11-23

## 2020-11-23 DIAGNOSIS — Z20.828 EXPOSURE TO SARS-ASSOCIATED CORONAVIRUS: Primary | ICD-10-CM

## 2020-11-24 DIAGNOSIS — Z20.828 EXPOSURE TO SARS-ASSOCIATED CORONAVIRUS: ICD-10-CM

## 2020-11-24 PROCEDURE — U0003 INFECTIOUS AGENT DETECTION BY NUCLEIC ACID (DNA OR RNA); SEVERE ACUTE RESPIRATORY SYNDROME CORONAVIRUS 2 (SARS-COV-2) (CORONAVIRUS DISEASE [COVID-19]), AMPLIFIED PROBE TECHNIQUE, MAKING USE OF HIGH THROUGHPUT TECHNOLOGIES AS DESCRIBED BY CMS-2020-01-R: HCPCS | Performed by: FAMILY MEDICINE

## 2020-11-25 LAB — SARS-COV-2 RNA SPEC QL NAA+PROBE: NOT DETECTED

## 2022-01-05 ENCOUNTER — HOSPITAL ENCOUNTER (EMERGENCY)
Facility: HOSPITAL | Age: 37
Discharge: HOME | End: 2022-01-05
Attending: EMERGENCY MEDICINE
Payer: COMMERCIAL

## 2022-01-05 ENCOUNTER — APPOINTMENT (EMERGENCY)
Dept: CT IMAGING | Facility: HOSPITAL | Age: 37
End: 2022-01-05
Payer: COMMERCIAL

## 2022-01-05 VITALS
HEART RATE: 77 BPM | SYSTOLIC BLOOD PRESSURE: 134 MMHG | RESPIRATION RATE: 20 BRPM | OXYGEN SATURATION: 99 % | DIASTOLIC BLOOD PRESSURE: 70 MMHG | TEMPERATURE: 98 F

## 2022-01-05 DIAGNOSIS — N20.0 NEPHROLITHIASIS: Primary | ICD-10-CM

## 2022-01-05 LAB
ALBUMIN SERPL BCP-MCNC: 4.6 G/DL (ref 3.5–5)
ALP SERPL-CCNC: 58 U/L (ref 46–116)
ALT SERPL W P-5'-P-CCNC: 17 U/L (ref 12–78)
ANION GAP SERPL CALCULATED.3IONS-SCNC: 12 MMOL/L (ref 4–13)
AST SERPL W P-5'-P-CCNC: 10 U/L (ref 5–45)
ATRIAL RATE: 61 BPM
BACTERIA UR QL AUTO: ABNORMAL /HPF
BASOPHILS # BLD AUTO: 0.1 THOUSANDS/ΜL (ref 0–0.1)
BASOPHILS NFR BLD AUTO: 2 % (ref 0–1)
BILIRUB DIRECT SERPL-MCNC: 0.29 MG/DL (ref 0–0.2)
BILIRUB SERPL-MCNC: 2.59 MG/DL (ref 0.2–1)
BILIRUB UR QL STRIP: NEGATIVE
BUN SERPL-MCNC: 7 MG/DL (ref 5–25)
CALCIUM SERPL-MCNC: 9.4 MG/DL (ref 8.3–10.1)
CHLORIDE SERPL-SCNC: 102 MMOL/L (ref 100–108)
CLARITY UR: ABNORMAL
CO2 SERPL-SCNC: 27 MMOL/L (ref 21–32)
COLOR UR: YELLOW
CREAT SERPL-MCNC: 1.09 MG/DL (ref 0.6–1.3)
EOSINOPHIL # BLD AUTO: 0.12 THOUSAND/ΜL (ref 0–0.61)
EOSINOPHIL NFR BLD AUTO: 2 % (ref 0–6)
ERYTHROCYTE [DISTWIDTH] IN BLOOD BY AUTOMATED COUNT: 11.9 % (ref 11.6–15.1)
GFR SERPL CREATININE-BSD FRML MDRD: 88 ML/MIN/1.73SQ M
GLUCOSE SERPL-MCNC: 141 MG/DL (ref 65–140)
GLUCOSE UR STRIP-MCNC: NEGATIVE MG/DL
HCT VFR BLD AUTO: 46.3 % (ref 36.5–49.3)
HGB BLD-MCNC: 16 G/DL (ref 12–17)
HGB UR QL STRIP.AUTO: ABNORMAL
IMM GRANULOCYTES # BLD AUTO: 0.01 THOUSAND/UL (ref 0–0.2)
IMM GRANULOCYTES NFR BLD AUTO: 0 % (ref 0–2)
KETONES UR STRIP-MCNC: ABNORMAL MG/DL
LACTATE SERPL-SCNC: 1.6 MMOL/L (ref 0.5–2)
LACTATE SERPL-SCNC: 2.5 MMOL/L (ref 0.5–2)
LEUKOCYTE ESTERASE UR QL STRIP: NEGATIVE
LIPASE SERPL-CCNC: 89 U/L (ref 73–393)
LYMPHOCYTES # BLD AUTO: 2.38 THOUSANDS/ΜL (ref 0.6–4.47)
LYMPHOCYTES NFR BLD AUTO: 39 % (ref 14–44)
MCH RBC QN AUTO: 31.4 PG (ref 26.8–34.3)
MCHC RBC AUTO-ENTMCNC: 34.6 G/DL (ref 31.4–37.4)
MCV RBC AUTO: 91 FL (ref 82–98)
MONOCYTES # BLD AUTO: 0.45 THOUSAND/ΜL (ref 0.17–1.22)
MONOCYTES NFR BLD AUTO: 7 % (ref 4–12)
NEUTROPHILS # BLD AUTO: 3.01 THOUSANDS/ΜL (ref 1.85–7.62)
NEUTS SEG NFR BLD AUTO: 50 % (ref 43–75)
NITRITE UR QL STRIP: NEGATIVE
NON-SQ EPI CELLS URNS QL MICRO: ABNORMAL /HPF
NRBC BLD AUTO-RTO: 0 /100 WBCS
P AXIS: 69 DEGREES
PH UR STRIP.AUTO: 8.5 [PH]
PLATELET # BLD AUTO: 294 THOUSANDS/UL (ref 149–390)
PMV BLD AUTO: 10.1 FL (ref 8.9–12.7)
POTASSIUM SERPL-SCNC: 4.1 MMOL/L (ref 3.5–5.3)
PR INTERVAL: 112 MS
PROT SERPL-MCNC: 7.7 G/DL (ref 6.4–8.2)
PROT UR STRIP-MCNC: ABNORMAL MG/DL
QRS AXIS: 73 DEGREES
QRSD INTERVAL: 100 MS
QT INTERVAL: 450 MS
QTC INTERVAL: 453 MS
RBC # BLD AUTO: 5.09 MILLION/UL (ref 3.88–5.62)
RBC #/AREA URNS AUTO: ABNORMAL /HPF
SODIUM SERPL-SCNC: 141 MMOL/L (ref 136–145)
SP GR UR STRIP.AUTO: 1.01 (ref 1–1.03)
T WAVE AXIS: 76 DEGREES
UROBILINOGEN UR QL STRIP.AUTO: 1 E.U./DL
VENTRICULAR RATE: 61 BPM
WBC # BLD AUTO: 6.07 THOUSAND/UL (ref 4.31–10.16)
WBC #/AREA URNS AUTO: ABNORMAL /HPF

## 2022-01-05 PROCEDURE — 81001 URINALYSIS AUTO W/SCOPE: CPT | Performed by: PHYSICIAN ASSISTANT

## 2022-01-05 PROCEDURE — 96375 TX/PRO/DX INJ NEW DRUG ADDON: CPT

## 2022-01-05 PROCEDURE — 96374 THER/PROPH/DIAG INJ IV PUSH: CPT

## 2022-01-05 PROCEDURE — 99285 EMERGENCY DEPT VISIT HI MDM: CPT | Performed by: PHYSICIAN ASSISTANT

## 2022-01-05 PROCEDURE — 80076 HEPATIC FUNCTION PANEL: CPT | Performed by: PHYSICIAN ASSISTANT

## 2022-01-05 PROCEDURE — 74176 CT ABD & PELVIS W/O CONTRAST: CPT

## 2022-01-05 PROCEDURE — 36415 COLL VENOUS BLD VENIPUNCTURE: CPT | Performed by: PHYSICIAN ASSISTANT

## 2022-01-05 PROCEDURE — 87040 BLOOD CULTURE FOR BACTERIA: CPT | Performed by: PHYSICIAN ASSISTANT

## 2022-01-05 PROCEDURE — 93005 ELECTROCARDIOGRAM TRACING: CPT

## 2022-01-05 PROCEDURE — 96361 HYDRATE IV INFUSION ADD-ON: CPT

## 2022-01-05 PROCEDURE — 83690 ASSAY OF LIPASE: CPT | Performed by: PHYSICIAN ASSISTANT

## 2022-01-05 PROCEDURE — 80048 BASIC METABOLIC PNL TOTAL CA: CPT | Performed by: PHYSICIAN ASSISTANT

## 2022-01-05 PROCEDURE — 99285 EMERGENCY DEPT VISIT HI MDM: CPT

## 2022-01-05 PROCEDURE — 85025 COMPLETE CBC W/AUTO DIFF WBC: CPT | Performed by: PHYSICIAN ASSISTANT

## 2022-01-05 PROCEDURE — 83605 ASSAY OF LACTIC ACID: CPT | Performed by: PHYSICIAN ASSISTANT

## 2022-01-05 PROCEDURE — 93010 ELECTROCARDIOGRAM REPORT: CPT | Performed by: INTERNAL MEDICINE

## 2022-01-05 RX ORDER — NAPROXEN 500 MG/1
500 TABLET ORAL 2 TIMES DAILY WITH MEALS
Qty: 30 TABLET | Refills: 0 | Status: SHIPPED | OUTPATIENT
Start: 2022-01-05

## 2022-01-05 RX ORDER — ONDANSETRON 2 MG/ML
4 INJECTION INTRAMUSCULAR; INTRAVENOUS ONCE
Status: COMPLETED | OUTPATIENT
Start: 2022-01-05 | End: 2022-01-05

## 2022-01-05 RX ORDER — KETOROLAC TROMETHAMINE 30 MG/ML
15 INJECTION, SOLUTION INTRAMUSCULAR; INTRAVENOUS ONCE
Status: COMPLETED | OUTPATIENT
Start: 2022-01-05 | End: 2022-01-05

## 2022-01-05 RX ORDER — TAMSULOSIN HYDROCHLORIDE 0.4 MG/1
0.4 CAPSULE ORAL ONCE
Status: COMPLETED | OUTPATIENT
Start: 2022-01-05 | End: 2022-01-05

## 2022-01-05 RX ORDER — MORPHINE SULFATE 4 MG/ML
4 INJECTION, SOLUTION INTRAMUSCULAR; INTRAVENOUS ONCE
Status: COMPLETED | OUTPATIENT
Start: 2022-01-05 | End: 2022-01-05

## 2022-01-05 RX ORDER — TAMSULOSIN HYDROCHLORIDE 0.4 MG/1
0.4 CAPSULE ORAL
Qty: 20 CAPSULE | Refills: 0 | Status: SHIPPED | OUTPATIENT
Start: 2022-01-05

## 2022-01-05 RX ORDER — ONDANSETRON 4 MG/1
4 TABLET, ORALLY DISINTEGRATING ORAL EVERY 8 HOURS PRN
Qty: 20 TABLET | Refills: 0 | Status: SHIPPED | OUTPATIENT
Start: 2022-01-05

## 2022-01-05 RX ADMIN — KETOROLAC TROMETHAMINE 15 MG: 30 INJECTION, SOLUTION INTRAMUSCULAR at 08:34

## 2022-01-05 RX ADMIN — MORPHINE SULFATE 4 MG: 4 INJECTION INTRAVENOUS at 09:33

## 2022-01-05 RX ADMIN — SODIUM CHLORIDE 1000 ML: 0.9 INJECTION, SOLUTION INTRAVENOUS at 08:34

## 2022-01-05 RX ADMIN — ONDANSETRON 4 MG: 2 INJECTION INTRAMUSCULAR; INTRAVENOUS at 09:32

## 2022-01-05 RX ADMIN — TAMSULOSIN HYDROCHLORIDE 0.4 MG: 0.4 CAPSULE ORAL at 11:42

## 2022-01-05 NOTE — DISCHARGE INSTRUCTIONS
Take medications as above as directed  Recommend strong hydration  Continue to strain urine until stone is passed  Follow up with Urology for reassessment and stone analysis  Return immediately to the ED with any new or worsening symptoms

## 2022-01-05 NOTE — ED NOTES
Patient transported to [address] [place] [address], [address] [place] [address]  01/05/22 5324

## 2022-01-05 NOTE — ED PROVIDER NOTES
History  Chief Complaint   Patient presents with    Flank Pain     L sided flank pain, no history of kidney stones     Ora Stadebra is a 79-year-old male with past medical history of anxiety presenting to the emergency department for evaluation with chief complaint of left-sided flank pain  Patient reports that this was rather abrupt in onset approximately 1 hour prior to presentation  He describes the quality of his pain as sharp, and localized to the left flank region  His pain is essentially constant though he reports some alleviation with lying flat  He denies nausea or episodes of vomiting, diarrhea, fevers, chills, sweats  He does not appreciate any associated urinary complaints, denying dysuria, urgency, frequency, hematuria  Denies any injuries or heavy lifting  He has not tried anything for symptom alleviation thus far  Denies similar symptoms in the past           Prior to Admission Medications   Prescriptions Last Dose Informant Patient Reported? Taking? PARoxetine (PAXIL) 10 mg tablet   Yes No   Sig: Take 10 mg by mouth daily   aspirin-acetaminophen-caffeine (EXCEDRIN MIGRAINE) 250-250-65 MG per tablet   Yes No   Sig: Take 1 tablet by mouth every 6 (six) hours as needed for headaches   bisacodyl (DULCOLAX) 5 mg EC tablet   Yes No   Sig: Take 5 mg by mouth daily as needed for constipation   multivitamin (THERAGRAN) TABS  Self Yes No   Sig: Take 1 tablet by mouth daily      Facility-Administered Medications: None       Past Medical History:   Diagnosis Date    Anxiety     Herpes        History reviewed  No pertinent surgical history      Family History   Problem Relation Age of Onset    Clotting disorder Mother     Fibromyalgia Mother     Hyperlipidemia Mother     Clotting disorder Father     Hypertension Father     Hyperlipidemia Father     Stroke Paternal Grandmother     Heart attack Paternal Grandfather     Aneurysm Neg Hx     Arrhythmia Neg Hx      I have reviewed and agree with the history as documented  E-Cigarette/Vaping     E-Cigarette/Vaping Substances     Social History     Tobacco Use    Smoking status: Never Smoker    Smokeless tobacco: Current User     Types: Chew   Substance Use Topics    Alcohol use: Yes     Comment: social     Drug use: No       Review of Systems   Constitutional: Negative for chills, diaphoresis, fatigue and fever  Respiratory: Negative for shortness of breath  Cardiovascular: Negative for chest pain  Gastrointestinal: Negative for abdominal pain, nausea and vomiting  Genitourinary: Positive for flank pain (left)  All other systems reviewed and are negative  Physical Exam  Physical Exam  Vitals and nursing note reviewed  Constitutional:       General: He is not in acute distress  Appearance: Normal appearance  He is well-developed  He is not ill-appearing, toxic-appearing or diaphoretic  HENT:      Head: Normocephalic and atraumatic  Right Ear: External ear normal       Left Ear: External ear normal    Eyes:      Conjunctiva/sclera: Conjunctivae normal    Cardiovascular:      Rate and Rhythm: Normal rate and regular rhythm  Pulses: Normal pulses  Pulmonary:      Effort: Pulmonary effort is normal  No respiratory distress  Breath sounds: Normal breath sounds  No wheezing, rhonchi or rales  Chest:      Chest wall: No tenderness  Abdominal:      General: There is no distension  Palpations: Abdomen is soft  Tenderness: There is no abdominal tenderness  There is left CVA tenderness  Musculoskeletal:         General: Normal range of motion  Cervical back: Normal range of motion and neck supple  Right lower leg: No edema  Left lower leg: No edema  Skin:     General: Skin is warm and dry  Capillary Refill: Capillary refill takes less than 2 seconds  Neurological:      Mental Status: He is alert  Motor: Motor function is intact  No weakness  Gait: Gait is intact  Vital Signs  ED Triage Vitals   Temperature Pulse Respirations Blood Pressure SpO2   01/05/22 0811 01/05/22 0811 01/05/22 0811 01/05/22 0811 01/05/22 0811   98 °F (36 7 °C) 67 22 (!) 85/53 100 %      Temp Source Heart Rate Source Patient Position - Orthostatic VS BP Location FiO2 (%)   01/05/22 0811 01/05/22 0811 01/05/22 0811 01/05/22 0811 --   Temporal Monitor Sitting Left arm       Pain Score       01/05/22 0834       10 - Worst Possible Pain           Vitals:    01/05/22 0900 01/05/22 0930 01/05/22 1000 01/05/22 1144   BP: 135/83 130/78 121/65 134/70   Pulse: 55 61 56 77   Patient Position - Orthostatic VS:    Sitting         Visual Acuity      ED Medications  Medications   sodium chloride 0 9 % bolus 1,000 mL (0 mL Intravenous Stopped 1/5/22 1046)   ketorolac (TORADOL) injection 15 mg (15 mg Intravenous Given 1/5/22 0834)   ondansetron (ZOFRAN) injection 4 mg (4 mg Intravenous Given 1/5/22 0932)   morphine (PF) 4 mg/mL injection 4 mg (4 mg Intravenous Given 1/5/22 0933)   tamsulosin (FLOMAX) capsule 0 4 mg (0 4 mg Oral Given 1/5/22 1142)       Diagnostic Studies  Results Reviewed     Procedure Component Value Units Date/Time    Blood culture #1 [[id number]] Collected: 01/05/22 0834    Lab Status: Preliminary result Specimen: Blood from Arm, Left Updated: 01/05/22 1301     Blood Culture Received in Microbiology Lab  Culture in Progress  Blood culture #2 [[id number]] Collected: 01/05/22 0834    Lab Status: Preliminary result Specimen: Blood from Arm, Left Updated: 01/05/22 1301     Blood Culture Received in Microbiology Lab  Culture in Progress  Lactic acid 2 Hours [[id number]]  (Normal) Collected: 01/05/22 1049    Lab Status: Final result Specimen: Blood from Arm, Left Updated: 01/05/22 1144     LACTIC ACID 1 6 mmol/L     Narrative:      Result may be elevated if tourniquet was used during collection      Urine Microscopic [[id number]]  (Abnormal) Collected: 01/05/22 1022    Lab Status: Final result Specimen: Urine, Clean Catch Updated: 01/05/22 1143     RBC, UA 30-50 /hpf      WBC, UA 2-4 /hpf      Epithelial Cells None Seen /hpf      Bacteria, UA Occasional /hpf     UA w Reflex to Microscopic w Reflex to Culture [[id number]]  (Abnormal) Collected: 01/05/22 1022    Lab Status: Final result Specimen: Urine, Clean Catch Updated: 01/05/22 1055     Color, UA Yellow     Clarity, UA Cloudy     Specific Gravity, UA 1 015     pH, UA 8 5     Leukocytes, UA Negative     Nitrite, UA Negative     Protein, UA 30 (1+) mg/dl      Glucose, UA Negative mg/dl      Ketones, UA Trace mg/dl      Urobilinogen, UA 1 0 E U /dl      Bilirubin, UA Negative     Blood, UA Large    CBC and differential [[id number]]  (Abnormal) Collected: 01/05/22 0834    Lab Status: Final result Specimen: Blood from Arm, Left Updated: 01/05/22 0914     WBC 6 07 Thousand/uL      RBC 5 09 Million/uL      Hemoglobin 16 0 g/dL      Hematocrit 46 3 %      MCV 91 fL      MCH 31 4 pg      MCHC 34 6 g/dL      RDW 11 9 %      MPV 10 1 fL      Platelets 140 Thousands/uL      nRBC 0 /100 WBCs      Neutrophils Relative 50 %      Immat GRANS % 0 %      Lymphocytes Relative 39 %      Monocytes Relative 7 %      Eosinophils Relative 2 %      Basophils Relative 2 %      Neutrophils Absolute 3 01 Thousands/µL      Immature Grans Absolute 0 01 Thousand/uL      Lymphocytes Absolute 2 38 Thousands/µL      Monocytes Absolute 0 45 Thousand/µL      Eosinophils Absolute 0 12 Thousand/µL      Basophils Absolute 0 10 Thousands/µL     Lactic acid [[id number]]  (Abnormal) Collected: 01/05/22 0834    Lab Status: Final result Specimen: Blood from Arm, Left Updated: 01/05/22 0907     LACTIC ACID 2 5 mmol/L     Narrative:      Result may be elevated if tourniquet was used during collection      Basic metabolic panel [[id number]]  (Abnormal) Collected: 01/05/22 0834    Lab Status: Final result Specimen: Blood from Arm, Left Updated: 01/05/22 0857     Sodium 141 mmol/L      Potassium 4 1 mmol/L      Chloride 102 mmol/L      CO2 27 mmol/L      ANION GAP 12 mmol/L      BUN 7 mg/dL      Creatinine 1 09 mg/dL      Glucose 141 mg/dL      Calcium 9 4 mg/dL      eGFR 88 ml/min/1 73sq m     Narrative:      [place] guidelines for Chronic Kidney Disease (CKD):     Stage 1 with normal or high GFR (GFR > 90 mL/min/1 73 square meters)    Stage 2 Mild CKD (GFR = 60-89 mL/min/1 73 square meters)    Stage 3A Moderate CKD (GFR = 45-59 mL/min/1 73 square meters)    Stage 3B Moderate CKD (GFR = 30-44 mL/min/1 73 square meters)    Stage 4 Severe CKD (GFR = 15-29 mL/min/1 73 square meters)    Stage 5 End Stage CKD (GFR <15 mL/min/1 73 square meters)  Note: GFR calculation is accurate only with a steady state creatinine    Hepatic function panel [[id number]]  (Abnormal) Collected: 01/05/22 0834    Lab Status: Final result Specimen: Blood from Arm, Left Updated: 01/05/22 0857     Total Bilirubin 2 59 mg/dL      Bilirubin, Direct 0 29 mg/dL      Alkaline Phosphatase 58 U/L      AST 10 U/L      ALT 17 U/L      Total Protein 7 7 g/dL      Albumin 4 6 g/dL     Lipase [[id number]]  (Normal) Collected: 01/05/22 0834    Lab Status: Final result Specimen: Blood from Arm, Left Updated: 01/05/22 0857     Lipase 89 u/L                  CT renal stone study abdomen pelvis wo contrast   Final Result by Tru Putnam MD (01/05 9218)      2 mm left UVJ calculus with minimal left hydroureteronephrosis  Punctate nonobstructing right renal calculus               Workstation performed: [id number]                    Procedures  ECG 12 Lead Documentation Only    Date/Time: 1/5/2022 8:20 AM  Performed by: Luanne Levin PA-C  Authorized by: Luanne Levin PA-C     Indications / Diagnosis:  Flank pain  Patient location:  ED  Interpretation:     Interpretation: non-specific    Rate:     ECG rate:  61    ECG rate assessment: normal    Rhythm:     Rhythm: sinus rhythm    Ectopy:     Ectopy: none    QRS:     QRS axis:  Normal    QRS intervals:  Normal  Conduction:     Conduction: normal    ST segments:     ST segments:  Normal  T waves:     T waves: normal               ED Course                                             MDM  Number of Diagnoses or Management Options  Nephrolithiasis: new and requires workup  Diagnosis management comments: This is a 34yo male presenting with left-sided flank pain  Acute in onset 1 hr ago  Pain is described as sharp, non-radiating  No urinary sx  No f/c, n/v/d    Differential diagnosis includes but is not limited to: nephrolithiasis, costochondritis, msk pain, assess for uti     Initial ED plan: lab work, imaging, UA, ivf/analgesics and reassessment    Final ED Assessment: Vital signs stable on ED presentation, examination as above  All labs and imaging independently reviewed with imaging interpreted by the Radiologist   Lab work is notable for elevated lactic acid presumably in setting of hypovolemia as this had cleared with recheck following administration of 1 L of IV fluids  UA bland without evidence of uti  CT reports findings of 2 mm of left UVJ calculus with minimal left hydroureteronephrosis, as well as punctate nonobstructing right nephrolithiasis  Patient advised of all findings  His pain was controlled while being observed in the emergency department  He was made aware of plan to discharge with scripts for naproxen, Flomax, and Zofran  Offered short-term narcotic script for breakthrough pain which was declined by the patient  He was given filters at time of discharge to continue straining his urine  Patient provided urology follow-up and was recommended to be seen in reassessment and for stone analysis  We had reviewed indications for ED return including but not limited to intractable flank pain, nausea or vomiting  The patient had verbalized understanding and was comfortable and agreeable with disposition and care plan  He was discharged in stable condition    The patient ambulated from the emergency department today without issue  Amount and/or Complexity of Data Reviewed  Clinical lab tests: ordered and reviewed  Tests in the radiology section of CPT®: ordered and reviewed  Review and summarize past medical records: yes  Independent visualization of images, tracings, or specimens: yes    Risk of Complications, Morbidity, and/or Mortality  Presenting problems: moderate  Diagnostic procedures: moderate  Management options: moderate    Patient Progress  Patient progress: stable      Disposition  Final diagnoses:   Nephrolithiasis - Per CT "2 mm left UVJ calculus with minimal left hydroureteronephrosis  Punctate nonobstructing right renal calculus "     Time reflects when diagnosis was documented in both MDM as applicable and the Disposition within this note     Time User Action Codes Description Comment    1/5/2022 11:12 AM Inga Lanza [N20 0] Nephrolithiasis     1/5/2022 11:14 AM Anna Sorto [N20 0] Nephrolithiasis Per CT "2 mm left UVJ calculus with minimal left hydroureteronephrosis  Punctate nonobstructing right renal calculus "      ED Disposition     ED Disposition Condition Date/Time Comment    Discharge Stable Wed Jan 5, 2022 [place] [address] discharge to home/self care  Follow-up Information     Follow up With Specialties Details Why Contact Info Additional Flower Guevara DO Family Medicine   [address] [place] [address]  [phone]       [facility] Urology   [address]  [address] [address] [phone] [facility], [address] [facility] [address], [facility], [place], [address]    [facility] Emergency Department Emergency Medicine  If symptoms worsen [address] [address] [facility] Emergency Department, [address] [facility], [place], [place] [facility], [address]          Discharge Medication List as of 1/5/2022 11:14 AM      START taking these medications    Details   naproxen (Naprosyn) 500 mg tablet Take 1 tablet (500 mg total) by mouth 2 (two) times a day with meals, Starting Wed 1/5/2022, Normal      ondansetron (Zofran ODT) 4 mg disintegrating tablet Take 1 tablet (4 mg total) by mouth every 8 (eight) hours as needed for nausea or vomiting, Starting Wed 1/5/2022, Normal      tamsulosin (FLOMAX) 0 4 mg Take 1 capsule (0 4 mg total) by mouth daily with dinner, Starting Wed 1/5/2022, Normal         CONTINUE these medications which have NOT CHANGED    Details   aspirin-acetaminophen-caffeine (EXCEDRIN MIGRAINE) 250-250-65 MG per tablet Take 1 tablet by mouth every 6 (six) hours as needed for headaches, Historical Med      bisacodyl (DULCOLAX) 5 mg EC tablet Take 5 mg by mouth daily as needed for constipation, Historical Med      multivitamin (THERAGRAN) TABS Take 1 tablet by mouth daily, Historical Med      PARoxetine (PAXIL) 10 mg tablet Take 10 mg by mouth daily, Historical Med             No discharge procedures on file      PDMP Review     None          ED Provider  Electronically Signed by           Camille Mchugh PA-C  01/05/22 5464

## 2022-01-10 LAB
BACTERIA BLD CULT: NORMAL
BACTERIA BLD CULT: NORMAL

## 2023-07-07 ENCOUNTER — APPOINTMENT (EMERGENCY)
Dept: RADIOLOGY | Facility: HOSPITAL | Age: 38
End: 2023-07-07
Payer: COMMERCIAL

## 2023-07-07 ENCOUNTER — HOSPITAL ENCOUNTER (EMERGENCY)
Facility: HOSPITAL | Age: 38
Discharge: HOME | End: 2023-07-08
Attending: EMERGENCY MEDICINE
Payer: COMMERCIAL

## 2023-07-07 DIAGNOSIS — R10.13 DYSPEPSIA: ICD-10-CM

## 2023-07-07 DIAGNOSIS — R07.9 CHEST PAIN, UNSPECIFIED TYPE: Primary | ICD-10-CM

## 2023-07-07 LAB
ALBUMIN SERPL BCP-MCNC: 5.1 G/DL (ref 3.5–5)
ALP SERPL-CCNC: 55 U/L (ref 34–104)
ALT SERPL W P-5'-P-CCNC: 30 U/L (ref 7–52)
ANION GAP SERPL CALCULATED.3IONS-SCNC: 9 MMOL/L
AST SERPL W P-5'-P-CCNC: 21 U/L (ref 13–39)
BASOPHILS # BLD AUTO: 0.08 THOUSANDS/ÂΜL (ref 0–0.1)
BASOPHILS NFR BLD AUTO: 1 % (ref 0–1)
BILIRUB SERPL-MCNC: 1.7 MG/DL (ref 0.2–1)
BUN SERPL-MCNC: 9 MG/DL (ref 5–25)
CALCIUM SERPL-MCNC: 9.7 MG/DL (ref 8.4–10.2)
CARDIAC TROPONIN I PNL SERPL HS: <2 NG/L
CHLORIDE SERPL-SCNC: 103 MMOL/L (ref 96–108)
CO2 SERPL-SCNC: 25 MMOL/L (ref 21–32)
CREAT SERPL-MCNC: 0.97 MG/DL (ref 0.6–1.3)
D DIMER PPP FEU-MCNC: <0.27 UG/ML FEU
EOSINOPHIL # BLD AUTO: 0.09 THOUSAND/ÂΜL (ref 0–0.61)
EOSINOPHIL NFR BLD AUTO: 1 % (ref 0–6)
ERYTHROCYTE [DISTWIDTH] IN BLOOD BY AUTOMATED COUNT: 12.3 % (ref 11.6–15.1)
GFR SERPL CREATININE-BSD FRML MDRD: 100 ML/MIN/1.73SQ M
GLUCOSE SERPL-MCNC: 111 MG/DL (ref 65–140)
HCT VFR BLD AUTO: 45.7 % (ref 36.5–49.3)
HGB BLD-MCNC: 15.8 G/DL (ref 12–17)
IMM GRANULOCYTES # BLD AUTO: 0.02 THOUSAND/UL (ref 0–0.2)
IMM GRANULOCYTES NFR BLD AUTO: 0 % (ref 0–2)
LYMPHOCYTES # BLD AUTO: 2.49 THOUSANDS/ÂΜL (ref 0.6–4.47)
LYMPHOCYTES NFR BLD AUTO: 30 % (ref 14–44)
MCH RBC QN AUTO: 31.7 PG (ref 26.8–34.3)
MCHC RBC AUTO-ENTMCNC: 34.6 G/DL (ref 31.4–37.4)
MCV RBC AUTO: 92 FL (ref 82–98)
MONOCYTES # BLD AUTO: 0.5 THOUSAND/ÂΜL (ref 0.17–1.22)
MONOCYTES NFR BLD AUTO: 6 % (ref 4–12)
NEUTROPHILS # BLD AUTO: 5.26 THOUSANDS/ÂΜL (ref 1.85–7.62)
NEUTS SEG NFR BLD AUTO: 62 % (ref 43–75)
NRBC BLD AUTO-RTO: 0 /100 WBCS
PLATELET # BLD AUTO: 249 THOUSANDS/UL (ref 149–390)
PMV BLD AUTO: 9.9 FL (ref 8.9–12.7)
POTASSIUM SERPL-SCNC: 3.6 MMOL/L (ref 3.5–5.3)
PROT SERPL-MCNC: 8.2 G/DL (ref 6.4–8.4)
RBC # BLD AUTO: 4.98 MILLION/UL (ref 3.88–5.62)
SODIUM SERPL-SCNC: 137 MMOL/L (ref 135–147)
WBC # BLD AUTO: 8.44 THOUSAND/UL (ref 4.31–10.16)

## 2023-07-07 PROCEDURE — 85025 COMPLETE CBC W/AUTO DIFF WBC: CPT | Performed by: EMERGENCY MEDICINE

## 2023-07-07 PROCEDURE — 85379 FIBRIN DEGRADATION QUANT: CPT | Performed by: EMERGENCY MEDICINE

## 2023-07-07 PROCEDURE — 80053 COMPREHEN METABOLIC PANEL: CPT | Performed by: EMERGENCY MEDICINE

## 2023-07-07 PROCEDURE — 36415 COLL VENOUS BLD VENIPUNCTURE: CPT | Performed by: EMERGENCY MEDICINE

## 2023-07-07 PROCEDURE — 84484 ASSAY OF TROPONIN QUANT: CPT | Performed by: EMERGENCY MEDICINE

## 2023-07-07 PROCEDURE — 71046 X-RAY EXAM CHEST 2 VIEWS: CPT

## 2023-07-07 PROCEDURE — 93005 ELECTROCARDIOGRAM TRACING: CPT

## 2023-07-08 VITALS
TEMPERATURE: 98.1 F | HEIGHT: 71 IN | BODY MASS INDEX: 20.3 KG/M2 | WEIGHT: 145 LBS | OXYGEN SATURATION: 97 % | RESPIRATION RATE: 20 BRPM | SYSTOLIC BLOOD PRESSURE: 131 MMHG | HEART RATE: 67 BPM | DIASTOLIC BLOOD PRESSURE: 76 MMHG

## 2023-07-08 LAB — CARDIAC TROPONIN I PNL SERPL HS: <2 NG/L

## 2023-07-08 PROCEDURE — 36415 COLL VENOUS BLD VENIPUNCTURE: CPT | Performed by: EMERGENCY MEDICINE

## 2023-07-08 PROCEDURE — 84484 ASSAY OF TROPONIN QUANT: CPT | Performed by: EMERGENCY MEDICINE

## 2023-07-08 RX ORDER — MAGNESIUM HYDROXIDE/ALUMINUM HYDROXICE/SIMETHICONE 120; 1200; 1200 MG/30ML; MG/30ML; MG/30ML
30 SUSPENSION ORAL ONCE
Status: COMPLETED | OUTPATIENT
Start: 2023-07-08 | End: 2023-07-08

## 2023-07-08 RX ADMIN — ALUMINUM HYDROXIDE, MAGNESIUM HYDROXIDE, AND DIMETHICONE 30 ML: 200; 20; 200 SUSPENSION ORAL at 01:19

## 2023-07-08 NOTE — DISCHARGE INSTRUCTIONS
Continue to take all of your medications as directed. Follow-up with appropriate providers as discussed. Return to the emergency department at anytime for any new or worsening symptoms.

## 2023-07-08 NOTE — ED PROVIDER NOTES
History  Chief Complaint   Patient presents with   • Chest Pain     Pt c/o chest pain that began 3 hrs ago     Patient states that he came into the emergency department because he felt some numbness and tingling in his left arm. He states he has had these symptoms off and on sporadically and has been seen and evaluated here in the emergency department several times in the past.  He has also been seen by cardiologist and had an outpatient stress test as well as 48-hour Holter monitor. There was no etiology determined for his chest pain. Patient denies any family history of early MI or sudden cardiac death. He chews tobacco but does not smoke it. He also drinks a fair amount of coffee and energy drinks. History provided by:  Patient   used: No    Chest Pain  Pain location:  L chest  Pain quality: sharp    Pain radiates to:  Does not radiate  Pain radiates to the back: no    Pain severity:  Moderate  Onset quality:  Sudden  Duration:  3 hours  Timing:  Intermittent  Progression:  Resolved  Chronicity:  Recurrent  Context: at rest    Relieved by:  Nothing  Worsened by:  Nothing tried  Ineffective treatments:  None tried  Associated symptoms: numbness    Associated symptoms: no abdominal pain, no back pain, no diaphoresis, no fever, no lower extremity edema, no near-syncope, no shortness of breath, not vomiting and no weakness    Risk factors: male sex    Risk factors: no diabetes mellitus, no hypertension, not obese, no prior DVT/PE and no smoking        Prior to Admission Medications   Prescriptions Last Dose Informant Patient Reported? Taking?    PARoxetine (PAXIL) 10 mg tablet   Yes No   Sig: Take 10 mg by mouth daily   aspirin-acetaminophen-caffeine (EXCEDRIN MIGRAINE) 250-250-65 MG per tablet   Yes No   Sig: Take 1 tablet by mouth every 6 (six) hours as needed for headaches   bisacodyl (DULCOLAX) 5 mg EC tablet   Yes No   Sig: Take 5 mg by mouth daily as needed for constipation multivitamin (THERAGRAN) TABS  Self Yes No   Sig: Take 1 tablet by mouth daily   naproxen (Naprosyn) 500 mg tablet   No No   Sig: Take 1 tablet (500 mg total) by mouth 2 (two) times a day with meals   ondansetron (Zofran ODT) 4 mg disintegrating tablet   No No   Sig: Take 1 tablet (4 mg total) by mouth every 8 (eight) hours as needed for nausea or vomiting   tamsulosin (FLOMAX) 0.4 mg   No No   Sig: Take 1 capsule (0.4 mg total) by mouth daily with dinner      Facility-Administered Medications: None       Past Medical History:   Diagnosis Date   • Anxiety    • Herpes        History reviewed. No pertinent surgical history. Family History   Problem Relation Age of Onset   • Clotting disorder Mother    • Fibromyalgia Mother    • Hyperlipidemia Mother    • Clotting disorder Father    • Hypertension Father    • Hyperlipidemia Father    • Stroke Paternal Grandmother    • Heart attack Paternal Grandfather    • Aneurysm Neg Hx    • Arrhythmia Neg Hx      I have reviewed and agree with the history as documented. E-Cigarette/Vaping     E-Cigarette/Vaping Substances     Social History     Tobacco Use   • Smoking status: Never   • Smokeless tobacco: Current     Types: Chew   Substance Use Topics   • Alcohol use: Yes     Comment: social    • Drug use: No       Review of Systems   Constitutional: Negative for diaphoresis and fever. Respiratory: Negative for shortness of breath. Cardiovascular: Positive for chest pain. Negative for near-syncope. Gastrointestinal: Negative for abdominal pain and vomiting. Musculoskeletal: Negative for back pain. Neurological: Positive for numbness. Negative for weakness. All other systems reviewed and are negative. Physical Exam  Physical Exam  Vitals and nursing note reviewed. Constitutional:       General: He is not in acute distress. Appearance: He is well-developed. HENT:      Head: Normocephalic and atraumatic.    Eyes:      Conjunctiva/sclera: Conjunctivae normal.   Cardiovascular:      Rate and Rhythm: Normal rate and regular rhythm. Heart sounds: Normal heart sounds. No murmur heard. Pulmonary:      Effort: Pulmonary effort is normal. No respiratory distress. Breath sounds: Normal breath sounds. Abdominal:      Palpations: Abdomen is soft. Tenderness: There is no abdominal tenderness. Musculoskeletal:         General: No swelling. Cervical back: Neck supple. Right lower leg: No tenderness. No edema. Left lower leg: No tenderness. No edema. Skin:     General: Skin is warm and dry. Capillary Refill: Capillary refill takes less than 2 seconds. Neurological:      General: No focal deficit present. Mental Status: He is alert and oriented to person, place, and time. Cranial Nerves: No cranial nerve deficit. Motor: No weakness.    Psychiatric:         Mood and Affect: Mood normal.         Vital Signs  ED Triage Vitals [07/07/23 2200]   Temperature Pulse Respirations Blood Pressure SpO2   98.1 °F (36.7 °C) 96 18 155/91 100 %      Temp Source Heart Rate Source Patient Position - Orthostatic VS BP Location FiO2 (%)   Oral Monitor Sitting Left arm --      Pain Score       --           Vitals:    07/07/23 2330 07/08/23 0000 07/08/23 0030 07/08/23 0100   BP: 127/74 140/77 134/71 131/76   Pulse: 68 77 66 67   Patient Position - Orthostatic VS: Sitting Sitting Sitting Sitting         Visual Acuity      ED Medications  Medications - No data to display    Diagnostic Studies  Results Reviewed     Procedure Component Value Units Date/Time    HS Troponin I 2hr [[id number]] Collected: 07/08/23 0021    Lab Status: Final result Specimen: Blood from Arm, Left Updated: 07/08/23 0057     hs TnI 2hr <2 ng/L      Delta 2hr hsTnI --    HS Troponin I 4hr [[id number]]     Lab Status: No result Specimen: Blood     D-Dimer [[id number]]  (Normal) Collected: 07/07/23 2227    Lab Status: Final result Specimen: Blood from Arm, Left Updated: 07/07/23 2315     D-Dimer, Quant <0.27 ug/ml FEU     HS Troponin 0hr (reflex protocol) [[id number]]  (Normal) Collected: 07/07/23 2227    Lab Status: Final result Specimen: Blood from Arm, Left Updated: 07/07/23 2306     hs TnI 0hr <2 ng/L     Comprehensive metabolic panel [[id number]]  (Abnormal) Collected: 07/07/23 2227    Lab Status: Final result Specimen: Blood from Arm, Left Updated: 07/07/23 2257     Sodium 137 mmol/L      Potassium 3.6 mmol/L      Chloride 103 mmol/L      CO2 25 mmol/L      ANION GAP 9 mmol/L      BUN 9 mg/dL      Creatinine 0.97 mg/dL      Glucose 111 mg/dL      Calcium 9.7 mg/dL      AST 21 U/L      ALT 30 U/L      Alkaline Phosphatase 55 U/L      Total Protein 8.2 g/dL      Albumin 5.1 g/dL      Total Bilirubin 1.70 mg/dL      eGFR 100 ml/min/1.73sq m     Narrative:      [employer] guidelines for Chronic Kidney Disease (CKD):   •  Stage 1 with normal or high GFR (GFR > 90 mL/min/1.73 square meters)  •  Stage 2 Mild CKD (GFR = 60-89 mL/min/1.73 square meters)  •  Stage 3A Moderate CKD (GFR = 45-59 mL/min/1.73 square meters)  •  Stage 3B Moderate CKD (GFR = 30-44 mL/min/1.73 square meters)  •  Stage 4 Severe CKD (GFR = 15-29 mL/min/1.73 square meters)  •  Stage 5 End Stage CKD (GFR <15 mL/min/1.73 square meters)  Note: GFR calculation is accurate only with a steady state creatinine    CBC and differential [[id number]] Collected: 07/07/23 2227    Lab Status: Final result Specimen: Blood from Arm, Left Updated: 07/07/23 2239     WBC 8.44 Thousand/uL      RBC 4.98 Million/uL      Hemoglobin 15.8 g/dL      Hematocrit 45.7 %      MCV 92 fL      MCH 31.7 pg      MCHC 34.6 g/dL      RDW 12.3 %      MPV 9.9 fL      Platelets 377 Thousands/uL      nRBC 0 /100 WBCs      Neutrophils Relative 62 %      Immat GRANS % 0 %      Lymphocytes Relative 30 %      Monocytes Relative 6 %      Eosinophils Relative 1 %      Basophils Relative 1 %      Neutrophils Absolute 5.26 Thousands/µL Immature Grans Absolute 0.02 Thousand/uL      Lymphocytes Absolute 2.49 Thousands/µL      Monocytes Absolute 0.50 Thousand/µL      Eosinophils Absolute 0.09 Thousand/µL      Basophils Absolute 0.08 Thousands/µL                  XR chest 2 views   ED Interpretation by John Archuleta MD (07/07 0525)   No acute cardiopulmonary disease                 Procedures  Procedures         ED Course                                             Medical Decision Making  72-year-old previously healthy male presents to the emergency department for multiple episodes of acute sporadic chest discomfort. Differential diagnosis includes but is not limited to ACS/MI, pneumonia, pulmonary embolism, aortic dissection, cardiac dysrhythmia, electrolyte abnormalities, anemia. Patient is well-appearing and has a completely benign neurological and cardiovascular examination. Vital signs are reviewed and deemed to be within normal limits. He is showing no signs of systemic illness or acute distress during his emergency department visit. Labs and imaging reviewed with patient. Considering his heart score, clinical history of no documented diabetes mellitus, hypertension, prior coronary artery disease. Plan is to discharge him to home with prompt follow-up with his PCP and cardiologist.  Patient did report some dyspepsia with me be the cause of his discomfort as well. I have provided him with the number for GI for outpatient follow-up as well and strict return precautions. Patient understands and agrees with plan as discussed and all questions answered prior to discharge. Amount and/or Complexity of Data Reviewed  Labs: ordered. Radiology: ordered and independent interpretation performed. ECG/medicine tests: ordered and independent interpretation performed.           Disposition  Final diagnoses:   Chest pain, unspecified type   Dyspepsia     Time reflects when diagnosis was documented in both MDM as applicable and the Disposition within this note     Time User Action Codes Description Comment    7/8/2023  1:03 AM Quinton Kalpana Add [R07.9] Chest pain, unspecified type     7/8/2023  1:04 AM Quinton Kalpana Add [R10.13] Dyspepsia       ED Disposition     ED Disposition   Discharge    Condition   Stable    Date/Time   Sat Jul 8, 2023  1:03 AM    Comment   Josh Tony discharge to home/self care. Follow-up Information     Follow up With Specialties Details Why Contact Info Additional [id number] [address] [place] Family Medicine Call in 1 day  21-23-83-89. [address] [facility] [address]  [phone]       Your cardiologist  Call in 1 day       [facility] Gastroenterology Call in 1 day  [address] [facility] [address] [address] [address] [place] [facility], [facility] [address] [facility], [facility] [address], [facility], [place], [address] [phone]           Patient's Medications   Discharge Prescriptions    No medications on file       No discharge procedures on file.     PDMP Review     None          ED Provider  Electronically Signed by           Zoraida Rees MD  07/08/23 1458

## 2023-07-09 LAB
ATRIAL RATE: 98 BPM
P AXIS: 76 DEGREES
PR INTERVAL: 124 MS
QRS AXIS: 78 DEGREES
QRSD INTERVAL: 112 MS
QT INTERVAL: 376 MS
QTC INTERVAL: 480 MS
T WAVE AXIS: 83 DEGREES
VENTRICULAR RATE: 98 BPM

## 2023-07-09 PROCEDURE — 93010 ELECTROCARDIOGRAM REPORT: CPT | Performed by: INTERNAL MEDICINE

## 2023-09-21 ENCOUNTER — APPOINTMENT (EMERGENCY)
Dept: RADIOLOGY | Facility: HOSPITAL | Age: 38
End: 2023-09-21
Payer: COMMERCIAL

## 2023-09-21 ENCOUNTER — HOSPITAL ENCOUNTER (EMERGENCY)
Facility: HOSPITAL | Age: 38
Discharge: HOME | End: 2023-09-21
Attending: EMERGENCY MEDICINE
Payer: COMMERCIAL

## 2023-09-21 VITALS
OXYGEN SATURATION: 100 % | SYSTOLIC BLOOD PRESSURE: 142 MMHG | RESPIRATION RATE: 16 BRPM | DIASTOLIC BLOOD PRESSURE: 86 MMHG | HEART RATE: 72 BPM | TEMPERATURE: 98.4 F

## 2023-09-21 DIAGNOSIS — R07.9 CHEST PAIN: Primary | ICD-10-CM

## 2023-09-21 LAB
ALBUMIN SERPL BCP-MCNC: 4.9 G/DL (ref 3.5–5)
ALP SERPL-CCNC: 60 U/L (ref 34–104)
ALT SERPL W P-5'-P-CCNC: 20 U/L (ref 7–52)
ANION GAP SERPL CALCULATED.3IONS-SCNC: 8 MMOL/L
AST SERPL W P-5'-P-CCNC: 18 U/L (ref 13–39)
BASOPHILS # BLD AUTO: 0.07 THOUSANDS/ÂΜL (ref 0–0.1)
BASOPHILS NFR BLD AUTO: 1 % (ref 0–1)
BILIRUB SERPL-MCNC: 1.92 MG/DL (ref 0.2–1)
BUN SERPL-MCNC: 10 MG/DL (ref 5–25)
CALCIUM SERPL-MCNC: 9.7 MG/DL (ref 8.4–10.2)
CARDIAC TROPONIN I PNL SERPL HS: <2 NG/L
CHLORIDE SERPL-SCNC: 101 MMOL/L (ref 96–108)
CO2 SERPL-SCNC: 29 MMOL/L (ref 21–32)
CREAT SERPL-MCNC: 0.88 MG/DL (ref 0.6–1.3)
EOSINOPHIL # BLD AUTO: 0.05 THOUSAND/ÂΜL (ref 0–0.61)
EOSINOPHIL NFR BLD AUTO: 1 % (ref 0–6)
ERYTHROCYTE [DISTWIDTH] IN BLOOD BY AUTOMATED COUNT: 12.1 % (ref 11.6–15.1)
GFR SERPL CREATININE-BSD FRML MDRD: 111 ML/MIN/1.73SQ M
GLUCOSE SERPL-MCNC: 91 MG/DL (ref 65–140)
HCT VFR BLD AUTO: 43 % (ref 36.5–49.3)
HGB BLD-MCNC: 14.5 G/DL (ref 12–17)
IMM GRANULOCYTES # BLD AUTO: 0.01 THOUSAND/UL (ref 0–0.2)
IMM GRANULOCYTES NFR BLD AUTO: 0 % (ref 0–2)
LIPASE SERPL-CCNC: 21 U/L (ref 11–82)
LYMPHOCYTES # BLD AUTO: 1.48 THOUSANDS/ÂΜL (ref 0.6–4.47)
LYMPHOCYTES NFR BLD AUTO: 28 % (ref 14–44)
MCH RBC QN AUTO: 30.8 PG (ref 26.8–34.3)
MCHC RBC AUTO-ENTMCNC: 33.7 G/DL (ref 31.4–37.4)
MCV RBC AUTO: 91 FL (ref 82–98)
MONOCYTES # BLD AUTO: 0.34 THOUSAND/ÂΜL (ref 0.17–1.22)
MONOCYTES NFR BLD AUTO: 7 % (ref 4–12)
NEUTROPHILS # BLD AUTO: 3.3 THOUSANDS/ÂΜL (ref 1.85–7.62)
NEUTS SEG NFR BLD AUTO: 63 % (ref 43–75)
NRBC BLD AUTO-RTO: 0 /100 WBCS
PLATELET # BLD AUTO: 250 THOUSANDS/UL (ref 149–390)
PMV BLD AUTO: 10 FL (ref 8.9–12.7)
POTASSIUM SERPL-SCNC: 3.9 MMOL/L (ref 3.5–5.3)
PROT SERPL-MCNC: 7.9 G/DL (ref 6.4–8.4)
RBC # BLD AUTO: 4.71 MILLION/UL (ref 3.88–5.62)
SODIUM SERPL-SCNC: 138 MMOL/L (ref 135–147)
WBC # BLD AUTO: 5.25 THOUSAND/UL (ref 4.31–10.16)

## 2023-09-21 PROCEDURE — 85025 COMPLETE CBC W/AUTO DIFF WBC: CPT | Performed by: EMERGENCY MEDICINE

## 2023-09-21 PROCEDURE — 80053 COMPREHEN METABOLIC PANEL: CPT | Performed by: EMERGENCY MEDICINE

## 2023-09-21 PROCEDURE — 71045 X-RAY EXAM CHEST 1 VIEW: CPT

## 2023-09-21 PROCEDURE — 99285 EMERGENCY DEPT VISIT HI MDM: CPT | Performed by: EMERGENCY MEDICINE

## 2023-09-21 PROCEDURE — 36415 COLL VENOUS BLD VENIPUNCTURE: CPT | Performed by: EMERGENCY MEDICINE

## 2023-09-21 PROCEDURE — 99285 EMERGENCY DEPT VISIT HI MDM: CPT

## 2023-09-21 PROCEDURE — 93005 ELECTROCARDIOGRAM TRACING: CPT

## 2023-09-21 PROCEDURE — 84484 ASSAY OF TROPONIN QUANT: CPT | Performed by: EMERGENCY MEDICINE

## 2023-09-21 PROCEDURE — 83690 ASSAY OF LIPASE: CPT | Performed by: EMERGENCY MEDICINE

## 2023-09-21 RX ORDER — SODIUM CHLORIDE 9 MG/ML
3 INJECTION INTRAVENOUS
Status: DISCONTINUED | OUTPATIENT
Start: 2023-09-21 | End: 2023-09-22 | Stop reason: HOSPADM

## 2023-09-22 LAB
ATRIAL RATE: 77 BPM
P AXIS: 58 DEGREES
PR INTERVAL: 132 MS
QRS AXIS: 47 DEGREES
QRSD INTERVAL: 110 MS
QT INTERVAL: 394 MS
QTC INTERVAL: 445 MS
T WAVE AXIS: 62 DEGREES
VENTRICULAR RATE: 77 BPM

## 2023-09-22 PROCEDURE — 93010 ELECTROCARDIOGRAM REPORT: CPT | Performed by: INTERNAL MEDICINE

## 2023-09-22 NOTE — ED PROVIDER NOTES
History  Chief Complaint   Patient presents with   • Chest Pain     Patient arrives to the Ed with complain of chest pain for the past 2 hours, patient states this has been happening often the past 2 year. States he cant concentrate and feel " panicky" . This is a 19-year-old male who presents to the emergency department complaining of chest pain. Patient states it started approximately 2 hours ago. He states once he started coming to the hospital the chest pain relieved. He describes it as a pressure-like sensation stabbing in the left and right side of his chest.  He states that he has had this multiple times in the past and has been seen by cardiology. He states that they have done an echocardiogram, a stress test, and a Holter monitor and have all been normal.  He denies fevers or chills. He denies nausea or vomiting. He denies leg pain or swelling. Prior to Admission Medications   Prescriptions Last Dose Informant Patient Reported? Taking? PARoxetine (PAXIL) 10 mg tablet   Yes No   Sig: Take 10 mg by mouth daily   aspirin-acetaminophen-caffeine (EXCEDRIN MIGRAINE) 250-250-65 MG per tablet   Yes No   Sig: Take 1 tablet by mouth every 6 (six) hours as needed for headaches   bisacodyl (DULCOLAX) 5 mg EC tablet   Yes No   Sig: Take 5 mg by mouth daily as needed for constipation   multivitamin (THERAGRAN) TABS  Self Yes No   Sig: Take 1 tablet by mouth daily   naproxen (Naprosyn) 500 mg tablet   No No   Sig: Take 1 tablet (500 mg total) by mouth 2 (two) times a day with meals   ondansetron (Zofran ODT) 4 mg disintegrating tablet   No No   Sig: Take 1 tablet (4 mg total) by mouth every 8 (eight) hours as needed for nausea or vomiting   tamsulosin (FLOMAX) 0.4 mg   No No   Sig: Take 1 capsule (0.4 mg total) by mouth daily with dinner      Facility-Administered Medications: None       Past Medical History:   Diagnosis Date   • Anxiety    • Herpes        History reviewed.  No pertinent surgical history. Family History   Problem Relation Age of Onset   • Clotting disorder Mother    • Fibromyalgia Mother    • Hyperlipidemia Mother    • Clotting disorder Father    • Hypertension Father    • Hyperlipidemia Father    • Stroke Paternal Grandmother    • Heart attack Paternal Grandfather    • Aneurysm Neg Hx    • Arrhythmia Neg Hx      I have reviewed and agree with the history as documented. E-Cigarette/Vaping     E-Cigarette/Vaping Substances     Social History     Tobacco Use   • Smoking status: Never   • Smokeless tobacco: Current     Types: Chew   Substance Use Topics   • Alcohol use: Yes     Comment: social    • Drug use: No       Review of Systems   All other systems reviewed and are negative.       Physical Exam  Physical Exam  Constitutional:  Vital signs reviewed, patient appears nontoxic, no acute distress  Eyes: Pupils equal round reactive to light and accommodation, extraocular muscles intact  HEENT: trachea midline, no JVD, moist mucous membranes  Respiratory: lung sounds clear throughout, no rhonchi, no rales  Cardiovascular: regular rate rhythm, no murmurs or rubs  Abdomen: soft, nontender, nondistended, no rebound or guarding  Back: no CVA tenderness, normal inspection  Extremities: no edema, pulses equal in all 4 extremities  Neuro: awake, alert, oriented, no focal weakness  Skin: warm, dry, intact, no rashes noted    Vital Signs  ED Triage Vitals [09/21/23 2218]   Temperature Pulse Respirations Blood Pressure SpO2   98.4 °F (36.9 °C) 72 16 142/86 100 %      Temp Source Heart Rate Source Patient Position - Orthostatic VS BP Location FiO2 (%)   Oral Monitor Sitting -- --      Pain Score       --           Vitals:    09/21/23 2218   BP: 142/86   Pulse: 72   Patient Position - Orthostatic VS: Sitting         Visual Acuity      ED Medications  Medications   sodium chloride (PF) 0.9 % injection 3 mL (has no administration in time range)       Diagnostic Studies  Results Reviewed     Procedure Component Value Units Date/Time    HS Troponin 0hr (reflex protocol) [[id number]]  (Normal) Collected: 09/21/23 2259    Lab Status: Final result Specimen: Blood from Arm, Right Updated: 09/21/23 2325     hs TnI 0hr <2 ng/L     HS Troponin I 2hr [[id number]]     Lab Status: No result Specimen: Blood     Lipase [[id number]]  (Normal) Collected: 09/21/23 2259    Lab Status: Final result Specimen: Blood from Arm, Right Updated: 09/21/23 2322     Lipase 21 u/L     Comprehensive metabolic panel [[id number]]  (Abnormal) Collected: 09/21/23 2259    Lab Status: Final result Specimen: Blood from Arm, Right Updated: 09/21/23 2322     Sodium 138 mmol/L      Potassium 3.9 mmol/L      Chloride 101 mmol/L      CO2 29 mmol/L      ANION GAP 8 mmol/L      BUN 10 mg/dL      Creatinine 0.88 mg/dL      Glucose 91 mg/dL      Calcium 9.7 mg/dL      AST 18 U/L      ALT 20 U/L      Alkaline Phosphatase 60 U/L      Total Protein 7.9 g/dL      Albumin 4.9 g/dL      Total Bilirubin 1.92 mg/dL      eGFR 111 ml/min/1.73sq m     Narrative:      [place] guidelines for Chronic Kidney Disease (CKD):   •  Stage 1 with normal or high GFR (GFR > 90 mL/min/1.73 square meters)  •  Stage 2 Mild CKD (GFR = 60-89 mL/min/1.73 square meters)  •  Stage 3A Moderate CKD (GFR = 45-59 mL/min/1.73 square meters)  •  Stage 3B Moderate CKD (GFR = 30-44 mL/min/1.73 square meters)  •  Stage 4 Severe CKD (GFR = 15-29 mL/min/1.73 square meters)  •  Stage 5 End Stage CKD (GFR <15 mL/min/1.73 square meters)  Note: GFR calculation is accurate only with a steady state creatinine    CBC and differential [[id number]] Collected: 09/21/23 2259    Lab Status: Final result Specimen: Blood from Arm, Right Updated: 09/21/23 2302     WBC 5.25 Thousand/uL      RBC 4.71 Million/uL      Hemoglobin 14.5 g/dL      Hematocrit 43.0 %      MCV 91 fL      MCH 30.8 pg      MCHC 33.7 g/dL      RDW 12.1 %      MPV 10.0 fL      Platelets 222 Thousands/uL      nRBC 0 /100 WBCs      Neutrophils Relative 63 %      Immat GRANS % 0 %      Lymphocytes Relative 28 %      Monocytes Relative 7 %      Eosinophils Relative 1 %      Basophils Relative 1 %      Neutrophils Absolute 3.30 Thousands/µL      Immature Grans Absolute 0.01 Thousand/uL      Lymphocytes Absolute 1.48 Thousands/µL      Monocytes Absolute 0.34 Thousand/µL      Eosinophils Absolute 0.05 Thousand/µL      Basophils Absolute 0.07 Thousands/µL                  X-ray chest 1 view portable   ED Interpretation by Yasmine Mena DO (09/21 2307)   No pneumonia or pneumothorax. Procedures  ECG 12 Lead Documentation Only    Date/Time: 9/21/2023 11:09 PM    Performed by: Yasmine Mena DO  Authorized by: Yasmine Mena DO    ECG reviewed by me, the ED Provider: yes    Patient location:  ED  Comments:      Normal sinus rhythm, rate of 77, normal NE, normal QTc, no STEMI, no change compared to EKG dated 7/7/2023, EKG independently interpreted by me             ED Course  ED Course as of 09/21/23 2334   Th Sep 21, 2023 2307 The patient had a chest x-ray that was independently interpreted by me that shows no pneumonia or pneumothorax. [phone] The patient had lab work that was significant for a white count of 5, troponin of less than 2, and a lipase of 21. The patient does have a total bili of 1.92. This has been as high as 2 in the past.  I doubt an acute process. The patient is not tachycardic or febrile he is satting 100%. The patient has no further complaints in the emergency department and will be discharged with follow-up to his primary care physician.              HEART Risk Score    Flowsheet Row Most Recent Value   Heart Score Risk Calculator    History 1 Filed at: 09/21/2023 2259   ECG 1 Filed at: 09/21/2023 2259   Age 0 Filed at: 09/21/2023 2259   Risk Factors 0 Filed at: 09/21/2023 2259   Troponin 0 Filed at: 09/21/2023 2259   HEART Score 2 Filed at: 09/21/2023 2259                PERC Rule for PE Flowsheet Row Most Recent Value   PERC Rule for PE    Age >=50 0 Filed at: 09/21/2023 2310   HR >=100 0 Filed at: 09/21/2023 2310   O2 Sat on room air < 95% 0 Filed at: 09/21/2023 2310   History of PE or DVT 0 Filed at: 09/21/2023 2310   Recent trauma or surgery 0 Filed at: 09/21/2023 2310   Hemoptysis 0 Filed at: 09/21/2023 2310   Exogenous estrogen 0 Filed at: 09/21/2023 2310   Unilateral leg swelling 0 Filed at: 09/21/2023 2310   [address] for PE Results 0 Filed at: 09/21/2023 2310                            Medical Decision Making  This is a 51-year-old male who presents to the emergency department complaining of chest pain. I considered ACS, pneumonia, pneumothorax, PE. These and other diagnoses were considered. Amount and/or Complexity of Data Reviewed  Labs: ordered. Radiology: ordered and independent interpretation performed. Risk  Prescription drug management. Disposition  Final diagnoses:   Chest pain     Time reflects when diagnosis was documented in both MDM as applicable and the Disposition within this note     Time User Action Codes Description Comment    9/21/2023 11:28 PM Rosalie Ayon Add [R07.9] Chest pain       ED Disposition     ED Disposition   Discharge    Condition   Stable    Date/Time   Thu Sep 21, 2023 11:28 PM    Comment   Nicki Thompson discharge to home/self care. Follow-up Information     Follow up With Specialties Details Why Contact Info Additional [id number] [facility], DO Family Medicine In 2 days  [address]  [facility] [address] [facility] Schedule an appointment as soon as possible for a visit   8 [facility] [address] [id number] [facility] [address] [facility] [address] [facility] Emergency Department Emergency Medicine  If symptoms worsen [address] [place] [address] [facility], [address] [facility], [address] [facility] [address]          Patient's Medications   Discharge Prescriptions    No medications on file       No discharge procedures on file.     PDMP Review     None          ED Provider  Electronically Signed by           Reagan Rodriguez DO  09/21/23 1357

## 2024-11-14 ENCOUNTER — OFFICE VISIT (OUTPATIENT)
Age: 39
End: 2024-11-14
Payer: COMMERCIAL

## 2024-11-14 ENCOUNTER — TELEPHONE (OUTPATIENT)
Age: 39
End: 2024-11-14

## 2024-11-14 ENCOUNTER — PREP FOR PROCEDURE (OUTPATIENT)
Age: 39
End: 2024-11-14

## 2024-11-14 VITALS
OXYGEN SATURATION: 98 % | BODY MASS INDEX: 22.4 KG/M2 | WEIGHT: 160 LBS | SYSTOLIC BLOOD PRESSURE: 118 MMHG | DIASTOLIC BLOOD PRESSURE: 80 MMHG | HEART RATE: 68 BPM | HEIGHT: 71 IN

## 2024-11-14 DIAGNOSIS — K58.1 IRRITABLE BOWEL SYNDROME WITH CONSTIPATION: ICD-10-CM

## 2024-11-14 DIAGNOSIS — K92.1 BLACK STOOL: ICD-10-CM

## 2024-11-14 DIAGNOSIS — R07.9 CHEST PAIN, UNSPECIFIED TYPE: Primary | ICD-10-CM

## 2024-11-14 DIAGNOSIS — K59.09 CHRONIC CONSTIPATION: ICD-10-CM

## 2024-11-14 DIAGNOSIS — K59.09 CHRONIC CONSTIPATION: Primary | ICD-10-CM

## 2024-11-14 PROCEDURE — 99204 OFFICE O/P NEW MOD 45 MIN: CPT | Performed by: INTERNAL MEDICINE

## 2024-11-14 RX ORDER — SODIUM CHLORIDE, SODIUM LACTATE, POTASSIUM CHLORIDE, CALCIUM CHLORIDE 600; 310; 30; 20 MG/100ML; MG/100ML; MG/100ML; MG/100ML
125 INJECTION, SOLUTION INTRAVENOUS CONTINUOUS
Status: CANCELLED | OUTPATIENT
Start: 2024-11-14

## 2024-11-14 RX ORDER — LUBIPROSTONE 8 UG/1
8 CAPSULE ORAL 2 TIMES DAILY WITH MEALS
Qty: 60 CAPSULE | Refills: 2 | Status: SHIPPED | OUTPATIENT
Start: 2024-11-14

## 2024-11-14 RX ORDER — DICYCLOMINE HYDROCHLORIDE 10 MG/1
10 CAPSULE ORAL
Qty: 120 CAPSULE | Refills: 0 | Status: SHIPPED | OUTPATIENT
Start: 2024-11-14

## 2024-11-14 RX ORDER — LINACLOTIDE 72 UG/1
1 CAPSULE, GELATIN COATED ORAL DAILY
Qty: 90 CAPSULE | Refills: 3 | Status: SHIPPED | OUTPATIENT
Start: 2024-11-14 | End: 2024-11-14

## 2024-11-14 NOTE — H&P (VIEW-ONLY)
[facility] - Outpatient Note  Lincoln Banegas 39 y.o. male MRN: [id number]  Encounter: [id number]      ASSESSMENT AND PLAN:    Lincoln Banegas is a 39 y.o. old pleasant male with PMH of palpitation, depression/anxiety, chronic constipation who presents for chest pain    Recurrent Chest pain - recurrent 4 years with extensive cardiac workup that has been negative.  Unclear if this is gastrointestinal issue.  This seems more suspicious for costochondritis given that it is related to movement (worse sitting, better lying down), intermittently reproducible with palpation, unrelieved with prolonged PPI course, and can occur without food.  However, given extensive workup from cardiology with no etiology will need endoscopic evaluation.  Plan for EGD and will biopsy for EOE, H. pylori and celiac disease (does have excessive burping)  Can discontinue Nexium given 1 year trial has not helped  Check TSH  Consider 24-hour pH testing in the future to evaluate for GERD causing pain  Consider manometry to evaluate for esophageal spasm  Can trial Voltaren gel or heating pad on the area    Chronic constipation-prolonged constipation for over a year with incomplete evacuation.  He has failed prolonged courses of MiraLAX.  Start Linzess 72 mcg daily  Increase Metamucil to 5 to 10 g  Check TSH  Counseled increasing fluid, fiber ambulation  Counseled on using stepping stool    Black stool-intermittent likely from Pepto-Bismol  EGD as above    Elevated bilirubin-suspect Gilbert's syndrome.  No direct bilirubin previously ordered.  Will check hepatic function panel.    There are no diagnoses linked to this encounter.  ______________________________________________________________________    SUBJECTIVE:      3-4 years of chest pain in the lower substernal on the xiphoid processes, everyday, can last two hours, sharp pain, better with water, a/w dizziness, sometimes radiates to left, worse sitting down, better  "with laying down, takes nexium 20mg for one year no help and     Cardiac workup in ED    Lots of burping    No dysphagia/odynophagia    Black stool intermittently    I reviewed prior external notes    I reviewed previous lab results and images      REVIEW OF SYSTEMS:     REVIEW OF ALL OTHER SYSTEMS IS OTHERWISE NEGATIVE.      Historical Information   Past Medical History:   Diagnosis Date    Anxiety     GERD (gastroesophageal reflux disease)     Herpes      History reviewed. No pertinent surgical history.  Social History   Social History     Substance and Sexual Activity   Alcohol Use Yes    Comment: social      Social History     Substance and Sexual Activity   Drug Use No     Social History     Tobacco Use   Smoking Status Never   Smokeless Tobacco Former    Types: Chew     Family History   Problem Relation Age of Onset    Clotting disorder Mother     Fibromyalgia Mother     Hyperlipidemia Mother     Clotting disorder Father     Hypertension Father     Hyperlipidemia Father     Stroke Paternal Grandmother     Heart attack Paternal Grandfather     Aneurysm Neg Hx     Arrhythmia Neg Hx        Meds/Allergies       Current Outpatient Medications:     dicyclomine (BENTYL) 10 mg capsule    esomeprazole (NexIUM) 20 mg capsule    linaCLOtide (Linzess) 72 MCG CAPS    aspirin-acetaminophen-caffeine (EXCEDRIN MIGRAINE) 250-250-65 MG per tablet    bisacodyl (DULCOLAX) 5 mg EC tablet    multivitamin (THERAGRAN) TABS    naproxen (Naprosyn) 500 mg tablet    ondansetron (Zofran ODT) 4 mg disintegrating tablet    PARoxetine (PAXIL) 10 mg tablet    tamsulosin (FLOMAX) 0.4 mg    No Known Allergies        Objective     Blood pressure 118/80, pulse 68, height 5' 11\" (1.803 m), weight 72.6 kg (160 lb), SpO2 98%. Body mass index is 22.32 kg/m².      PHYSICAL EXAM:      General Appearance:   Alert, cooperative, no distress   HEENT:   Normocephalic, atraumatic, anicteric.     Neck:  Supple, symmetrical, trachea midline   Lungs:   Clear to " auscultation bilaterally; no rales, rhonchi or wheezing; respirations unlabored    Heart::   Regular rate and rhythm; no murmur, rub, or gallop.   Abdomen:   Soft, non-tender, non-distended; normal bowel sounds; no masses, no organomegaly    Genitalia:   Deferred    Rectal:   Deferred    Extremities:  No cyanosis, clubbing or edema    Pulses:  2+ and symmetric    Skin:  No jaundice, rashes, or lesions    Lymph nodes:  No palpable cervical lymphadenopathy        Lab Results:   No visits with results within 1 Day(s) from this visit.   Latest known visit with results is:   Admission on 09/21/2023, Discharged on 09/21/2023   Component Date Value    Ventricular Rate 09/21/2023 77     Atrial Rate 09/21/2023 77     MS Interval 09/21/2023 132     QRSD Interval 09/21/2023 110     QT Interval 09/21/2023 394     QTC Interval 09/21/2023 445     P Axis 09/21/2023 58     QRS [place] 09/21/2023 47     T Wave [place] 09/21/2023 62     WBC 09/21/2023 5.25     RBC 09/21/2023 4.71     Hemoglobin 09/21/2023 14.5     Hematocrit 09/21/2023 43.0     MCV 09/21/2023 91     MCH 09/21/2023 30.8     MCHC 09/21/2023 33.7     RDW 09/21/2023 12.1     MPV 09/21/2023 10.0     Platelets 09/21/2023 250     nRBC 09/21/2023 0     Segmented % 09/21/2023 63     Immature Grans % 09/21/2023 0     Lymphocytes % 09/21/2023 28     Monocytes % 09/21/2023 7     Eosinophils Relative 09/21/2023 1     Basophils Relative 09/21/2023 1     Absolute Neutrophils 09/21/2023 3.30     Absolute Immature Grans 09/21/2023 0.01     Absolute Lymphocytes 09/21/2023 1.48     Absolute Monocytes 09/21/2023 0.34     Eosinophils Absolute 09/21/2023 0.05     Basophils Absolute 09/21/2023 0.07     hs TnI 0hr 09/21/2023 <2     Sodium 09/21/2023 138     Potassium 09/21/2023 3.9     Chloride 09/21/2023 101     CO2 09/21/2023 29     ANION GAP 09/21/2023 8     BUN 09/21/2023 10     Creatinine 09/21/2023 0.88     Glucose 09/21/2023 91     Calcium 09/21/2023 9.7     AST 09/21/2023 18     ALT  09/21/2023 20     Alkaline Phosphatase 09/21/2023 60     Total Protein 09/21/2023 7.9     Albumin 09/21/2023 4.9     Total Bilirubin 09/21/2023 1.92 (H)     eGFR 09/21/2023 111     Lipase 09/21/2023 21          Radiology Results:   No results found.

## 2024-11-14 NOTE — TELEPHONE ENCOUNTER
Pt calling in with questions regarding new prescriptions.    He reports bentyl was not discussed during his OV today with Dr. Trinh and was not sure if he should be taking medications.   Also linzess is over $300 copay after coupon code for medication. I called pharmacy and no prior auth required.   Please advise if pt should be on bentyl and if alternative for linzess?

## 2024-11-14 NOTE — PATIENT INSTRUCTIONS
Scheduled date of EGD(as of today): 11/22/24  Physician performing EGD: Gayathri  Location of EGD: [place]  Instructions reviewed with patient by: Maru VALENCIA  Clearances:

## 2024-11-14 NOTE — TELEPHONE ENCOUNTER
I tried prescribing Amitiza but that may be expensive as well. If it is cost-prohibitive, then he should take miralax 1-2x per day instead. Thank you.

## 2024-11-14 NOTE — PROGRESS NOTES
[facility] - Outpatient Note  Lincoln Banegas 39 y.o. male MRN: [id number]  Encounter: [id number]      ASSESSMENT AND PLAN:    Lincoln Banegas is a 39 y.o. old pleasant male with PMH of palpitation, depression/anxiety, chronic constipation who presents for chest pain    Recurrent Chest pain - recurrent 4 years with extensive cardiac workup that has been negative.  Unclear if this is gastrointestinal issue.  This seems more suspicious for costochondritis given that it is related to movement (worse sitting, better lying down), intermittently reproducible with palpation, unrelieved with prolonged PPI course, and can occur without food.  However, given extensive workup from cardiology with no etiology will need endoscopic evaluation.  Plan for EGD and will biopsy for EOE, H. pylori and celiac disease (does have excessive burping)  Can discontinue Nexium given 1 year trial has not helped  Check TSH  Consider 24-hour pH testing in the future to evaluate for GERD causing pain  Consider manometry to evaluate for esophageal spasm  Can trial Voltaren gel or heating pad on the area    Chronic constipation-prolonged constipation for over a year with incomplete evacuation.  He has failed prolonged courses of MiraLAX.  Start Linzess 72 mcg daily  Increase Metamucil to 5 to 10 g  Check TSH  Counseled increasing fluid, fiber ambulation  Counseled on using stepping stool    Black stool-intermittent likely from Pepto-Bismol  EGD as above    Elevated bilirubin-suspect Gilbert's syndrome.  No direct bilirubin previously ordered.  Will check hepatic function panel.    There are no diagnoses linked to this encounter.  ______________________________________________________________________    SUBJECTIVE:      3-4 years of chest pain in the lower substernal on the xiphoid processes, everyday, can last two hours, sharp pain, better with water, a/w dizziness, sometimes radiates to left, worse sitting down, better  "with laying down, takes nexium 20mg for one year no help and     Cardiac workup in ED    Lots of burping    No dysphagia/odynophagia    Black stool intermittently    I reviewed prior external notes    I reviewed previous lab results and images      REVIEW OF SYSTEMS:     REVIEW OF ALL OTHER SYSTEMS IS OTHERWISE NEGATIVE.      Historical Information   Past Medical History:   Diagnosis Date    Anxiety     GERD (gastroesophageal reflux disease)     Herpes      History reviewed. No pertinent surgical history.  Social History   Social History     Substance and Sexual Activity   Alcohol Use Yes    Comment: social      Social History     Substance and Sexual Activity   Drug Use No     Social History     Tobacco Use   Smoking Status Never   Smokeless Tobacco Former    Types: Chew     Family History   Problem Relation Age of Onset    Clotting disorder Mother     Fibromyalgia Mother     Hyperlipidemia Mother     Clotting disorder Father     Hypertension Father     Hyperlipidemia Father     Stroke Paternal Grandmother     Heart attack Paternal Grandfather     Aneurysm Neg Hx     Arrhythmia Neg Hx        Meds/Allergies       Current Outpatient Medications:     dicyclomine (BENTYL) 10 mg capsule    esomeprazole (NexIUM) 20 mg capsule    linaCLOtide (Linzess) 72 MCG CAPS    aspirin-acetaminophen-caffeine (EXCEDRIN MIGRAINE) 250-250-65 MG per tablet    bisacodyl (DULCOLAX) 5 mg EC tablet    multivitamin (THERAGRAN) TABS    naproxen (Naprosyn) 500 mg tablet    ondansetron (Zofran ODT) 4 mg disintegrating tablet    PARoxetine (PAXIL) 10 mg tablet    tamsulosin (FLOMAX) 0.4 mg    No Known Allergies        Objective     Blood pressure 118/80, pulse 68, height 5' 11\" (1.803 m), weight 72.6 kg (160 lb), SpO2 98%. Body mass index is 22.32 kg/m².      PHYSICAL EXAM:      General Appearance:   Alert, cooperative, no distress   HEENT:   Normocephalic, atraumatic, anicteric.     Neck:  Supple, symmetrical, trachea midline   Lungs:   Clear to " auscultation bilaterally; no rales, rhonchi or wheezing; respirations unlabored    Heart::   Regular rate and rhythm; no murmur, rub, or gallop.   Abdomen:   Soft, non-tender, non-distended; normal bowel sounds; no masses, no organomegaly    Genitalia:   Deferred    Rectal:   Deferred    Extremities:  No cyanosis, clubbing or edema    Pulses:  2+ and symmetric    Skin:  No jaundice, rashes, or lesions    Lymph nodes:  No palpable cervical lymphadenopathy        Lab Results:   No visits with results within 1 Day(s) from this visit.   Latest known visit with results is:   Admission on 09/21/2023, Discharged on 09/21/2023   Component Date Value    Ventricular Rate 09/21/2023 77     Atrial Rate 09/21/2023 77     [place] Interval 09/21/2023 132     QRSD Interval 09/21/2023 110     QT Interval 09/21/2023 394     QTC Interval 09/21/2023 445     P Axis 09/21/2023 58     QRS [place] 09/21/2023 47     T Wave [place] 09/21/2023 62     WBC 09/21/2023 5.25     RBC 09/21/2023 4.71     Hemoglobin 09/21/2023 14.5     Hematocrit 09/21/2023 43.0     MCV 09/21/2023 91     MCH 09/21/2023 30.8     MCHC 09/21/2023 33.7     RDW 09/21/2023 12.1     MPV 09/21/2023 10.0     Platelets 09/21/2023 250     nRBC 09/21/2023 0     Segmented % 09/21/2023 63     Immature Grans % 09/21/2023 0     Lymphocytes % 09/21/2023 28     Monocytes % 09/21/2023 7     Eosinophils Relative 09/21/2023 1     Basophils Relative 09/21/2023 1     Absolute Neutrophils 09/21/2023 3.30     Absolute Immature Grans 09/21/2023 0.01     Absolute Lymphocytes 09/21/2023 1.48     Absolute Monocytes 09/21/2023 0.34     Eosinophils Absolute 09/21/2023 0.05     Basophils Absolute 09/21/2023 0.07     hs TnI 0hr 09/21/2023 <2     Sodium 09/21/2023 138     Potassium 09/21/2023 3.9     Chloride 09/21/2023 101     CO2 09/21/2023 29     ANION GAP 09/21/2023 8     BUN 09/21/2023 10     Creatinine 09/21/2023 0.88     Glucose 09/21/2023 91     Calcium 09/21/2023 9.7     AST 09/21/2023 18     ALT  09/21/2023 20     Alkaline Phosphatase 09/21/2023 60     Total Protein 09/21/2023 7.9     Albumin 09/21/2023 4.9     Total Bilirubin 09/21/2023 1.92 (H)     eGFR 09/21/2023 111     Lipase 09/21/2023 21          Radiology Results:   No results found.

## 2024-11-15 NOTE — TELEPHONE ENCOUNTER
He can hold off on taking the medication and I will see him for his procedure next week and talk to him about it.  Thank you.

## 2024-11-22 ENCOUNTER — ANESTHESIA (OUTPATIENT)
Dept: GASTROENTEROLOGY | Facility: HOSPITAL | Age: 39
End: 2024-11-22
Payer: COMMERCIAL

## 2024-11-22 ENCOUNTER — ANESTHESIA EVENT (OUTPATIENT)
Dept: GASTROENTEROLOGY | Facility: HOSPITAL | Age: 39
End: 2024-11-22
Payer: COMMERCIAL

## 2024-11-22 ENCOUNTER — HOSPITAL ENCOUNTER (OUTPATIENT)
Dept: GASTROENTEROLOGY | Facility: HOSPITAL | Age: 39
Setting detail: OUTPATIENT SURGERY
End: 2024-11-22
Attending: INTERNAL MEDICINE
Payer: COMMERCIAL

## 2024-11-22 VITALS
HEIGHT: 71 IN | SYSTOLIC BLOOD PRESSURE: 124 MMHG | TEMPERATURE: 98.2 F | DIASTOLIC BLOOD PRESSURE: 79 MMHG | OXYGEN SATURATION: 100 % | BODY MASS INDEX: 22.22 KG/M2 | HEART RATE: 59 BPM | RESPIRATION RATE: 18 BRPM | WEIGHT: 158.73 LBS

## 2024-11-22 DIAGNOSIS — K92.1 BLACK STOOL: ICD-10-CM

## 2024-11-22 DIAGNOSIS — R07.9 CHEST PAIN, UNSPECIFIED TYPE: ICD-10-CM

## 2024-11-22 PROCEDURE — 88305 TISSUE EXAM BY PATHOLOGIST: CPT | Performed by: PATHOLOGY

## 2024-11-22 PROCEDURE — 43239 EGD BIOPSY SINGLE/MULTIPLE: CPT | Performed by: INTERNAL MEDICINE

## 2024-11-22 RX ORDER — PROPOFOL 10 MG/ML
INJECTION, EMULSION INTRAVENOUS AS NEEDED
Status: DISCONTINUED | OUTPATIENT
Start: 2024-11-22 | End: 2024-11-22

## 2024-11-22 RX ORDER — SODIUM CHLORIDE, SODIUM LACTATE, POTASSIUM CHLORIDE, CALCIUM CHLORIDE 600; 310; 30; 20 MG/100ML; MG/100ML; MG/100ML; MG/100ML
125 INJECTION, SOLUTION INTRAVENOUS CONTINUOUS
Status: DISPENSED | OUTPATIENT
Start: 2024-11-22

## 2024-11-22 RX ORDER — LIDOCAINE HYDROCHLORIDE 20 MG/ML
INJECTION, SOLUTION EPIDURAL; INFILTRATION; INTRACAUDAL; PERINEURAL AS NEEDED
Status: DISCONTINUED | OUTPATIENT
Start: 2024-11-22 | End: 2024-11-22

## 2024-11-22 RX ADMIN — PROPOFOL 30 MG: 10 INJECTION, EMULSION INTRAVENOUS at 08:18

## 2024-11-22 RX ADMIN — LIDOCAINE HYDROCHLORIDE 100 MG: 20 INJECTION, SOLUTION EPIDURAL; INFILTRATION; INTRACAUDAL; PERINEURAL at 08:12

## 2024-11-22 RX ADMIN — PROPOFOL 150 MG: 10 INJECTION, EMULSION INTRAVENOUS at 08:12

## 2024-11-22 RX ADMIN — PROPOFOL 50 MG: 10 INJECTION, EMULSION INTRAVENOUS at 08:15

## 2024-11-22 RX ADMIN — SODIUM CHLORIDE, SODIUM LACTATE, POTASSIUM CHLORIDE, AND CALCIUM CHLORIDE 125 ML/HR: .6; .31; .03; .02 INJECTION, SOLUTION INTRAVENOUS at 07:53

## 2024-11-22 NOTE — INTERVAL H&P NOTE
H&P reviewed. After examining the patient I find no changes in the patients condition since the H&P had been written.    Vitals:    11/22/24 0740   BP: 138/83   Pulse: 68   Resp: 18   Temp: (!) 97.3 °F (36.3 °C)   SpO2: 98%

## 2024-11-22 NOTE — ANESTHESIA POSTPROCEDURE EVALUATION
Post-Op Assessment Note    CV Status:  Stable    Pain management: adequate       Mental Status:  Sleepy and arousable   Hydration Status:  Euvolemic   PONV Controlled:  Controlled   Airway Patency:  Patent     Post Op Vitals Reviewed: Yes    No anethesia notable event occurred.    Staff: CRNA           Last Filed PACU Vitals:  Vitals Value Taken Time   Temp 98.2 °F (36.8 °C) 11/22/24 0828   Pulse 64 11/22/24 0828   /68 11/22/24 0828   Resp 16 11/22/24 0828   SpO2 98 % 11/22/24 0828       Modified Sarah:  Activity: 2 (11/22/2024  8:28 AM)  Respiration: 2 (11/22/2024  8:28 AM)  Circulation: 2 (11/22/2024  8:28 AM)  Consciousness: 1 (11/22/2024  8:28 AM)  Oxygen Saturation: 2 (11/22/2024  8:28 AM)  Modified Sarah Score: 9 (11/22/2024  8:28 AM)

## 2024-11-22 NOTE — ANESTHESIA PREPROCEDURE EVALUATION
Procedure:  EGD    Relevant Problems   CARDIO   (+) Chest pain      Herpes    Anxiety    GERD (gastroesophageal reflux disease)      Physical Exam    Airway    Mallampati score: II  TM Distance: >3 FB  Neck ROM: full     Dental       Cardiovascular  Cardiovascular exam normal    Pulmonary  Pulmonary exam normal     Other Findings        Anesthesia Plan  ASA Score- 2     Anesthesia Type- IV sedation with anesthesia with ASA Monitors.         Additional Monitors:     Airway Plan:            Plan Factors-Exercise tolerance (METS): >4 METS.    Chart reviewed. EKG reviewed. Imaging results reviewed. Existing labs reviewed. Patient summary reviewed.                  Induction- intravenous.    Postoperative Plan-         Informed Consent- Anesthetic plan and risks discussed with patient.  I personally reviewed this patient with the CRNA. Discussed and agreed on the Anesthesia Plan with the CRNA..

## 2024-11-26 PROCEDURE — 88305 TISSUE EXAM BY PATHOLOGIST: CPT | Performed by: PATHOLOGY

## 2024-11-27 ENCOUNTER — RESULTS FOLLOW-UP (OUTPATIENT)
Dept: GASTROENTEROLOGY | Facility: CLINIC | Age: 39
End: 2024-11-27

## 2024-12-18 ENCOUNTER — HOSPITAL ENCOUNTER (EMERGENCY)
Facility: HOSPITAL | Age: 39
Discharge: HOME | End: 2024-12-18
Attending: EMERGENCY MEDICINE
Payer: COMMERCIAL

## 2024-12-18 VITALS
RESPIRATION RATE: 20 BRPM | DIASTOLIC BLOOD PRESSURE: 71 MMHG | SYSTOLIC BLOOD PRESSURE: 127 MMHG | TEMPERATURE: 98.6 F | HEART RATE: 62 BPM | OXYGEN SATURATION: 100 %

## 2024-12-18 DIAGNOSIS — H81.10 BPPV (BENIGN PAROXYSMAL POSITIONAL VERTIGO): Primary | ICD-10-CM

## 2024-12-18 PROCEDURE — 99283 EMERGENCY DEPT VISIT LOW MDM: CPT

## 2024-12-18 PROCEDURE — 99284 EMERGENCY DEPT VISIT MOD MDM: CPT | Performed by: EMERGENCY MEDICINE

## 2024-12-18 PROCEDURE — 93005 ELECTROCARDIOGRAM TRACING: CPT

## 2024-12-18 NOTE — ED PROVIDER NOTES
Time reflects when diagnosis was documented in both MDM as applicable and the Disposition within this note       Time User Action Codes Description Comment    12/18/2024  5:56 PM Aneta Joshua Add [H81.10] BPPV (benign paroxysmal positional vertigo)           ED Disposition       ED Disposition   Discharge    Condition   Good    Date/Time   Wed Dec 18, 2024  5:55 PM    Comment   Lincoln Banegas discharge to home/self care.             Assessment & Plan       Medical Decision Making  This a 39-year-old male who presents here today for evaluation of dizziness.  He was bending over to the right side at work to pick something and started feeling that things were spinning around him.  He says he gets this on a frequent basis, multiple times a week, most commonly in the morning when he first gets up, as he says it is from lying on his side to standing upright.  It usually last for several seconds, but today it was bad for a couple of minutes and did linger for about 15 minutes.  It has completely resolved since then and not recurred.  He says he only difference from prior episodes is duration of symptoms today.  He denies preceding head injuries.  He had GI illness about a week and a half ago which completely resolved and has had no worsening of symptoms, lightheadedness prior to today.  He denies any associated chest pain or palpitations, fevers or URI symptoms.  He denies new medications or changes in medications, recent over-the-counter medication use.  He did have about 2 beers last night but no recent binges or heavy alcohol use.  He says since this happened at work, they checked his blood pressure which was in the 40s, and the blood sugar, which was 120, and advised he come to the ER for evaluation.  He says he had just eaten lunch prior to the episode and his blood sugar being checked.  He says he has had multiple ED visits for chest pain over the past several years with this extensive workup, and does  not know if he has mentioned the dizziness before.  He has never been told that his blood sugars were elevated before, or concerns for diabetes.    Review of systems: Otherwise negative unless stated as above    He is well-appearing, no acute distress. He has no reproducible dizziness or neurologic deficits on exam.  Exam is unremarkable.  Symptoms are most likely due to BPPV, given brief nature and multiple recurrent episodes.  I am not concerned about CVA or TIA, anemia, electrolyte abnormality.  Blood sugar of 120 just after eating is not concerning for diabetes.  He was not having any symptoms to raise concern for underlying dysrhythmia.  I discussed with him management of symptoms at home, follow-up with PCP for further evaluation, and indications for return, and he expresses understanding of this plan.    Problems Addressed:  BPPV (benign paroxysmal positional vertigo): acute illness or injury    Amount and/or Complexity of Data Reviewed  External Data Reviewed: labs and notes.  ECG/medicine tests: ordered and independent interpretation performed. Decision-making details documented in ED Course.             Medications - No data to display    ED Risk Strat Scores                          SBIRT 22yo+      Flowsheet Row Most Recent Value   Initial Alcohol Screen: US AUDIT-C     1. How often do you have a drink containing alcohol? 0 Filed at: 12/18/2024 1601   2. How many drinks containing alcohol do you have on a typical day you are drinking?  0 Filed at: 12/18/2024 1601   3a. Male UNDER 65: How often do you have five or more drinks on one occasion? 0 Filed at: 12/18/2024 1601   Audit-C Score 0 Filed at: 12/18/2024 1601   NORMA: How many times in the past year have you...    Used an illegal drug or used a prescription medication for non-medical reasons? Never Filed at: 12/18/2024 1601                            History of Present Illness       Chief Complaint   Patient presents with    Dizziness     C/o dizziness  today while at work around 2pm when he was looking under something. States symptoms are slowly resolving.        Past Medical History:   Diagnosis Date    Anxiety     GERD (gastroesophageal reflux disease)     Herpes       History reviewed. No pertinent surgical history.   Family History   Problem Relation Age of Onset    Clotting disorder Mother     Fibromyalgia Mother     Hyperlipidemia Mother     Clotting disorder Father     Hypertension Father     Hyperlipidemia Father     Stroke Paternal Grandmother     Heart attack Paternal Grandfather     Aneurysm Neg Hx     Arrhythmia Neg Hx       Social History     Tobacco Use    Smoking status: Never    Smokeless tobacco: Former     Types: Chew   Vaping Use    Vaping status: Never Used   Substance Use Topics    Alcohol use: Yes     Comment: social     Drug use: No      E-Cigarette/Vaping    E-Cigarette Use Never User       E-Cigarette/Vaping Substances    Nicotine No     THC No     CBD No     Flavoring No     Other No     Unknown No       I have reviewed and agree with the history as documented.       Dizziness      Review of Systems   Neurological:  Positive for dizziness.           Objective       ED Triage Vitals [12/18/24 1600]   Temperature Pulse Blood Pressure Respirations SpO2 Patient Position - Orthostatic VS   98.6 °F (37 °C) 69 138/75 22 100 % Sitting      Temp src Heart Rate Source BP Location FiO2 (%) Pain Score    -- Monitor Left arm -- --      Vitals      Date and Time Temp Pulse SpO2 Resp BP Pain Score FACES Pain Rating User   12/18/24 1600 98.6 °F (37 °C) 69 100 % 22 138/75 -- -- AS            Physical Exam  Vitals and nursing note reviewed.   Constitutional:       General: He is not in acute distress.     Appearance: He is well-developed.   HENT:      Head: Normocephalic and atraumatic.   Eyes:      Extraocular Movements: Extraocular movements intact.      Conjunctiva/sclera: Conjunctivae normal.      Pupils: Pupils are equal, round, and reactive to light.    Neck:      Trachea: No tracheal deviation.   Cardiovascular:      Rate and Rhythm: Normal rate and regular rhythm.      Heart sounds: Normal heart sounds.   Pulmonary:      Effort: Pulmonary effort is normal. No respiratory distress.      Breath sounds: Normal breath sounds.   Abdominal:      General: There is no distension.      Palpations: Abdomen is soft.      Tenderness: There is no abdominal tenderness.   Musculoskeletal:      Cervical back: Normal range of motion.   Skin:     General: Skin is warm and dry.   Neurological:      General: No focal deficit present.      Mental Status: He is alert and oriented to person, place, and time.      GCS: GCS eye subscore is 4. GCS verbal subscore is 5. GCS motor subscore is 6.      Cranial Nerves: No cranial nerve deficit, dysarthria or facial asymmetry.      Sensory: Sensation is intact. No sensory deficit.      Motor: Motor function is intact. No weakness.      Coordination: Coordination is intact. Coordination normal. Finger-Nose-Finger Test normal.      Gait: Gait and tandem walk normal.      Comments: No reproducible dizziness   Psychiatric:         Mood and Affect: Mood and affect normal.         Behavior: Behavior normal.         Results Reviewed       None            No orders to display       ECG 12 Lead Documentation Only    Date/Time: 12/18/2024 6:08 PM    Performed by: Aneta Joshua MD  Authorized by: Aneta Joshua MD    Indications / Diagnosis:  Dizziness  ECG reviewed by me, the ED Provider: yes    Patient location:  ED  Previous ECG:     Previous ECG:  Compared to current    Comparison ECG info:  09/21/23    Similarity:  No change  Interpretation:     Interpretation: non-specific    Rate:     ECG rate:  72    ECG rate assessment: normal    Rhythm:     Rhythm: sinus rhythm      Rhythm comment:  Sinus arrhythmia  Ectopy:     Ectopy: none    QRS:     QRS axis:  Normal    QRS intervals:  Normal  Conduction:     Conduction:  abnormal      Abnormal conduction: incomplete RBBB    ST segments:     ST segments:  Normal  T waves:     T waves: normal        ED Medication and Procedure Management   Prior to Admission Medications   Prescriptions Last Dose Informant Patient Reported? Taking?   aspirin-acetaminophen-caffeine (EXCEDRIN MIGRAINE) 250-250-65 MG per tablet  Self Yes No   Sig: Take 1 tablet by mouth every 6 (six) hours as needed for headaches   dicyclomine (BENTYL) 10 mg capsule   No No   Sig: Take 1 capsule (10 mg total) by mouth 4 (four) times a day (before meals and at bedtime)   esomeprazole (NexIUM) 20 mg capsule  Self Yes No   Sig: Take 20 mg by mouth every morning before breakfast   lubiprostone (AMITIZA) 8 mcg capsule   No No   Sig: Take 1 capsule (8 mcg total) by mouth 2 (two) times a day with meals   multivitamin (THERAGRAN) TABS  Self Yes No   Sig: Take 1 tablet by mouth daily      Facility-Administered Medications: None     Patient's Medications   Discharge Prescriptions    No medications on file     No discharge procedures on file.  ED SEPSIS DOCUMENTATION   Time reflects when diagnosis was documented in both MDM as applicable and the Disposition within this note       Time User Action Codes Description Comment    12/18/2024  5:56 PM Aneta Joshua Add [H81.10] BPPV (benign paroxysmal positional vertigo)                  Aneta Joshua MD  12/20/24 2038

## 2024-12-18 NOTE — DISCHARGE INSTRUCTIONS
Make sure you are drinking fluids to keep yourself hydrated.  Stand up slowly especially first thing in the morning.  Do the exercises several times a day to help manage symptoms.  Follow-up with your primary care doctor given concern about your blood sugar, though it is within an expected range for just after eating.  If you continue to have symptoms, talk with your doctor about referral to physical therapy for vestibular therapy to help with symptoms.  Return for new or changing symptoms, or for any other concerns.

## 2024-12-19 LAB
ATRIAL RATE: 72 BPM
P AXIS: 66 DEGREES
PR INTERVAL: 120 MS
QRS AXIS: 81 DEGREES
QRSD INTERVAL: 114 MS
QT INTERVAL: 394 MS
QTC INTERVAL: 431 MS
T WAVE AXIS: 69 DEGREES
VENTRICULAR RATE: 72 BPM

## 2024-12-19 PROCEDURE — 93010 ELECTROCARDIOGRAM REPORT: CPT | Performed by: STUDENT IN AN ORGANIZED HEALTH CARE EDUCATION/TRAINING PROGRAM
